# Patient Record
Sex: MALE | Race: BLACK OR AFRICAN AMERICAN | Employment: UNEMPLOYED | URBAN - METROPOLITAN AREA
[De-identification: names, ages, dates, MRNs, and addresses within clinical notes are randomized per-mention and may not be internally consistent; named-entity substitution may affect disease eponyms.]

---

## 2020-10-17 PROBLEM — S82.401A CLOSED FRACTURE OF RIGHT FIBULA AND TIBIA: Status: ACTIVE | Noted: 2020-10-17

## 2020-10-17 PROBLEM — S82.201A CLOSED FRACTURE OF RIGHT FIBULA AND TIBIA: Status: ACTIVE | Noted: 2020-10-17

## 2022-03-29 ENCOUNTER — HOSPITAL ENCOUNTER (INPATIENT)
Age: 42
LOS: 16 days | Discharge: HOME | DRG: 750 | End: 2022-04-14
Attending: EMERGENCY MEDICINE | Admitting: PSYCHIATRY & NEUROLOGY
Payer: COMMERCIAL

## 2022-03-29 DIAGNOSIS — R44.3 HALLUCINATION: Primary | ICD-10-CM

## 2022-03-29 PROBLEM — F20.0 SCHIZOPHRENIA, PARANOID (HCC): Status: ACTIVE | Noted: 2022-03-29

## 2022-03-29 LAB
ACETAMINOPHEN LEVEL: <5 MCG/ML (ref 10–30)
ALBUMIN SERPL-MCNC: 4.2 G/DL (ref 3.5–5.2)
ALP BLD-CCNC: 71 U/L (ref 40–129)
ALT SERPL-CCNC: 16 U/L (ref 0–40)
AMPHETAMINE SCREEN, URINE: NOT DETECTED
ANION GAP SERPL CALCULATED.3IONS-SCNC: 14 MMOL/L (ref 7–16)
AST SERPL-CCNC: 16 U/L (ref 0–39)
BARBITURATE SCREEN URINE: NOT DETECTED
BASOPHILS ABSOLUTE: 0.02 E9/L (ref 0–0.2)
BASOPHILS RELATIVE PERCENT: 0.3 % (ref 0–2)
BENZODIAZEPINE SCREEN, URINE: NOT DETECTED
BILIRUB SERPL-MCNC: 0.2 MG/DL (ref 0–1.2)
BUN BLDV-MCNC: 12 MG/DL (ref 6–20)
CALCIUM SERPL-MCNC: 8.5 MG/DL (ref 8.6–10.2)
CANNABINOID SCREEN URINE: NOT DETECTED
CHLORIDE BLD-SCNC: 101 MMOL/L (ref 98–107)
CO2: 24 MMOL/L (ref 22–29)
COCAINE METABOLITE SCREEN URINE: NOT DETECTED
CREAT SERPL-MCNC: 0.9 MG/DL (ref 0.7–1.2)
EKG ATRIAL RATE: 86 BPM
EKG P AXIS: 52 DEGREES
EKG P-R INTERVAL: 186 MS
EKG Q-T INTERVAL: 376 MS
EKG QRS DURATION: 92 MS
EKG QTC CALCULATION (BAZETT): 449 MS
EKG R AXIS: 60 DEGREES
EKG T AXIS: 29 DEGREES
EKG VENTRICULAR RATE: 86 BPM
EOSINOPHILS ABSOLUTE: 0.03 E9/L (ref 0.05–0.5)
EOSINOPHILS RELATIVE PERCENT: 0.5 % (ref 0–6)
ETHANOL: <10 MG/DL (ref 0–0.08)
FENTANYL SCREEN, URINE: NOT DETECTED
GFR AFRICAN AMERICAN: >60
GFR NON-AFRICAN AMERICAN: >60 ML/MIN/1.73
GLUCOSE BLD-MCNC: 128 MG/DL (ref 74–99)
HCT VFR BLD CALC: 33.8 % (ref 37–54)
HEMOGLOBIN: 11.2 G/DL (ref 12.5–16.5)
IMMATURE GRANULOCYTES #: 0.04 E9/L
IMMATURE GRANULOCYTES %: 0.6 % (ref 0–5)
INFLUENZA A: NOT DETECTED
INFLUENZA B: NOT DETECTED
LYMPHOCYTES ABSOLUTE: 1.4 E9/L (ref 1.5–4)
LYMPHOCYTES RELATIVE PERCENT: 21.5 % (ref 20–42)
Lab: NORMAL
MCH RBC QN AUTO: 30.4 PG (ref 26–35)
MCHC RBC AUTO-ENTMCNC: 33.1 % (ref 32–34.5)
MCV RBC AUTO: 91.8 FL (ref 80–99.9)
METHADONE SCREEN, URINE: NOT DETECTED
MONOCYTES ABSOLUTE: 0.81 E9/L (ref 0.1–0.95)
MONOCYTES RELATIVE PERCENT: 12.4 % (ref 2–12)
NEUTROPHILS ABSOLUTE: 4.22 E9/L (ref 1.8–7.3)
NEUTROPHILS RELATIVE PERCENT: 64.7 % (ref 43–80)
OPIATE SCREEN URINE: NOT DETECTED
OXYCODONE URINE: NOT DETECTED
PDW BLD-RTO: 13.2 FL (ref 11.5–15)
PHENCYCLIDINE SCREEN URINE: NOT DETECTED
PLATELET # BLD: 184 E9/L (ref 130–450)
PMV BLD AUTO: 10 FL (ref 7–12)
POTASSIUM SERPL-SCNC: 3.4 MMOL/L (ref 3.5–5)
RBC # BLD: 3.68 E12/L (ref 3.8–5.8)
SALICYLATE, SERUM: <0.3 MG/DL (ref 0–30)
SARS-COV-2 RNA, RT PCR: NOT DETECTED
SODIUM BLD-SCNC: 139 MMOL/L (ref 132–146)
TOTAL PROTEIN: 7.2 G/DL (ref 6.4–8.3)
TRICYCLIC ANTIDEPRESSANTS SCREEN SERUM: NEGATIVE NG/ML
VALPROIC ACID LEVEL: 3 MCG/ML (ref 50–100)
WBC # BLD: 6.5 E9/L (ref 4.5–11.5)

## 2022-03-29 PROCEDURE — 82077 ASSAY SPEC XCP UR&BREATH IA: CPT

## 2022-03-29 PROCEDURE — 85025 COMPLETE CBC W/AUTO DIFF WBC: CPT

## 2022-03-29 PROCEDURE — 80307 DRUG TEST PRSMV CHEM ANLYZR: CPT

## 2022-03-29 PROCEDURE — 80143 DRUG ASSAY ACETAMINOPHEN: CPT

## 2022-03-29 PROCEDURE — 1240000000 HC EMOTIONAL WELLNESS R&B

## 2022-03-29 PROCEDURE — 99285 EMERGENCY DEPT VISIT HI MDM: CPT

## 2022-03-29 PROCEDURE — 93010 ELECTROCARDIOGRAM REPORT: CPT | Performed by: INTERNAL MEDICINE

## 2022-03-29 PROCEDURE — 36415 COLL VENOUS BLD VENIPUNCTURE: CPT

## 2022-03-29 PROCEDURE — 80053 COMPREHEN METABOLIC PANEL: CPT

## 2022-03-29 PROCEDURE — 87636 SARSCOV2 & INF A&B AMP PRB: CPT

## 2022-03-29 PROCEDURE — 80164 ASSAY DIPROPYLACETIC ACD TOT: CPT

## 2022-03-29 PROCEDURE — 80179 DRUG ASSAY SALICYLATE: CPT

## 2022-03-29 PROCEDURE — 93005 ELECTROCARDIOGRAM TRACING: CPT | Performed by: EMERGENCY MEDICINE

## 2022-03-29 RX ORDER — LANOLIN ALCOHOL/MO/W.PET/CERES
3 CREAM (GRAM) TOPICAL NIGHTLY
Status: DISCONTINUED | OUTPATIENT
Start: 2022-03-29 | End: 2022-04-14 | Stop reason: HOSPADM

## 2022-03-29 RX ORDER — HYDROXYZINE HYDROCHLORIDE 10 MG/1
50 TABLET, FILM COATED ORAL 3 TIMES DAILY PRN
Status: DISCONTINUED | OUTPATIENT
Start: 2022-03-29 | End: 2022-03-29 | Stop reason: RX

## 2022-03-29 RX ORDER — HALOPERIDOL 5 MG/ML
5 INJECTION INTRAMUSCULAR EVERY 6 HOURS PRN
Status: DISCONTINUED | OUTPATIENT
Start: 2022-03-29 | End: 2022-04-14 | Stop reason: HOSPADM

## 2022-03-29 RX ORDER — NICOTINE 21 MG/24HR
1 PATCH, TRANSDERMAL 24 HOURS TRANSDERMAL DAILY
Status: DISCONTINUED | OUTPATIENT
Start: 2022-03-29 | End: 2022-03-29 | Stop reason: RX

## 2022-03-29 RX ORDER — HYDROXYZINE PAMOATE 50 MG/1
50 CAPSULE ORAL 3 TIMES DAILY PRN
Status: DISCONTINUED | OUTPATIENT
Start: 2022-03-29 | End: 2022-04-14 | Stop reason: HOSPADM

## 2022-03-29 RX ORDER — OLANZAPINE 10 MG/1
10 TABLET ORAL NIGHTLY
Status: ON HOLD | COMMUNITY
End: 2022-04-14 | Stop reason: HOSPADM

## 2022-03-29 RX ORDER — ACETAMINOPHEN 325 MG/1
650 TABLET ORAL EVERY 6 HOURS PRN
Status: DISCONTINUED | OUTPATIENT
Start: 2022-03-29 | End: 2022-04-14 | Stop reason: HOSPADM

## 2022-03-29 RX ORDER — OLANZAPINE 5 MG/1
5 TABLET ORAL 2 TIMES DAILY
Status: DISCONTINUED | OUTPATIENT
Start: 2022-03-29 | End: 2022-03-30

## 2022-03-29 RX ORDER — DIVALPROEX SODIUM 250 MG/1
250 TABLET, DELAYED RELEASE ORAL 2 TIMES DAILY
Status: ON HOLD | COMMUNITY
End: 2022-04-14 | Stop reason: HOSPADM

## 2022-03-29 RX ORDER — DIVALPROEX SODIUM 250 MG/1
250 TABLET, DELAYED RELEASE ORAL 2 TIMES DAILY
Status: DISCONTINUED | OUTPATIENT
Start: 2022-03-29 | End: 2022-03-30

## 2022-03-29 RX ORDER — HALOPERIDOL 5 MG
5 TABLET ORAL EVERY 6 HOURS PRN
Status: DISCONTINUED | OUTPATIENT
Start: 2022-03-29 | End: 2022-04-14 | Stop reason: HOSPADM

## 2022-03-29 RX ORDER — MAGNESIUM HYDROXIDE/ALUMINUM HYDROXICE/SIMETHICONE 120; 1200; 1200 MG/30ML; MG/30ML; MG/30ML
30 SUSPENSION ORAL PRN
Status: DISCONTINUED | OUTPATIENT
Start: 2022-03-29 | End: 2022-04-14 | Stop reason: HOSPADM

## 2022-03-29 ASSESSMENT — PAIN DESCRIPTION - ORIENTATION: ORIENTATION: RIGHT

## 2022-03-29 ASSESSMENT — PAIN DESCRIPTION - LOCATION: LOCATION: LEG

## 2022-03-29 ASSESSMENT — SLEEP AND FATIGUE QUESTIONNAIRES
DIFFICULTY STAYING ASLEEP: YES
DO YOU HAVE DIFFICULTY SLEEPING: YES
RESTFUL SLEEP: NO
DIFFICULTY FALLING ASLEEP: YES
SLEEP PATTERN: DIFFICULTY FALLING ASLEEP;DISTURBED/INTERRUPTED SLEEP
AVERAGE NUMBER OF SLEEP HOURS: 3
DO YOU USE A SLEEP AID: NO
DIFFICULTY ARISING: NO

## 2022-03-29 ASSESSMENT — PAIN DESCRIPTION - DESCRIPTORS: DESCRIPTORS: ACHING;CONSTANT;DISCOMFORT

## 2022-03-29 ASSESSMENT — PAIN DESCRIPTION - FREQUENCY: FREQUENCY: CONTINUOUS

## 2022-03-29 ASSESSMENT — PAIN DESCRIPTION - PROGRESSION: CLINICAL_PROGRESSION: NOT CHANGED

## 2022-03-29 ASSESSMENT — PAIN SCALES - GENERAL
PAINLEVEL_OUTOF10: 7
PAINLEVEL_OUTOF10: 0

## 2022-03-29 ASSESSMENT — PAIN DESCRIPTION - DIRECTION: RADIATING_TOWARDS: NON

## 2022-03-29 ASSESSMENT — LIFESTYLE VARIABLES: HISTORY_ALCOHOL_USE: YES

## 2022-03-29 ASSESSMENT — PAIN DESCRIPTION - PAIN TYPE: TYPE: CHRONIC PAIN

## 2022-03-29 ASSESSMENT — PAIN DESCRIPTION - ONSET: ONSET: ON-GOING

## 2022-03-29 ASSESSMENT — PAIN - FUNCTIONAL ASSESSMENT: PAIN_FUNCTIONAL_ASSESSMENT: ACTIVITIES ARE NOT PREVENTED

## 2022-03-29 NOTE — ED NOTES
Nurse to Nurse report given to Lev Parrish on [address].      Tristen Pope RN  03/29/22 2666

## 2022-03-29 NOTE — ED NOTES
Emergency Department [facility] AN AFFILIATE OF [facility] Biopsychosocial Assessment Note    Chief Complaint:   hearing voices that say that they want to kill him. Kendall Connell denies wanting to hurt himself and states \"I just want to live\"    Patient wanting back on his medications that he has not been on since he was released from assisted on 3/25/2022. Hearing voices that tell he they want to kill him. Denies wanting to hurt himself states \"I want to live\"    Clinical Summary/History:   Pt presented from the [facility]. He explained that he called the police himself because he was hearing auditory hallucination and said that the voices are trying to kill him. Pt is from [place] and treated with Catrachita Borjas many years ago for paranoid schizophrenia. He said that he was on a shot that would help with the voices, but since being in assisted he was off the shot. Pt was released from [facility] on 3/25/22 and is a sex offender. He has a hx of suicide attempts, last attempting by overdosing on pills many years ago. He has no MH services at this time. Pt was is scheduled to follow up with [facility] on 4/13/22 to start services. He was taking zyprexa and depakote in assisted for the past 1 year. MSE:  Pt denies HI and no visual hallucinations. He is calm, cooperative, alert and oriented x's 3, shared fair eye contact - obviously internally stimulated, looking around the room when talking and appears to be distracted with possible voices, unkempt, thoughts are unstable, fair insight and judgement. Risk Factors:  Mental Health Diagnosis - paranoid schizophrenic  Prior suicide attempts  Limited family/friend support  Previous trouble with the law - sex offendor    Protective Factors: Outpatient provider - appt to [facility] on 4/13/22  Help-seeking behavior  Safe and stable housing  Has access to essential needs  Hx of Medication compliant  Good communication Skills  No access to weapons.     [x] Discussed protective and risk factors with RN and ED Physician     Gender  [x] Male [] Female [] Transgender  [] Other    Sexual Orientation    [x] Heterosexual [] Homosexual [] Bisexual [] Other    Suicidal Behavioral: CSSR-S Complete. [x] Reports:    [] Past [] Present   [] Denies    Homicidal/ Violent Behavior  [] Reports:   [] Past [] Present   [x] Denies     Violence Risk Screenin. Have you ever engaged in a physical fight? []  Yes [x]  No  2. Have you ever had an order of protection taken out against you? []  Yes [x]  No  3. Have you ever been arrested due to violence? []  Yes [x]  No  4. Have you ever been cruel to animals? []  Yes [x]  No    If any of the above questions are affirmative, please complete these questions:  1. Have you ever thought about hurting someone? []  No  []  Yes (Ask the questions listed below)   When?  Did you follow through with the thoughts? []  No  []  Yes - What happened? 2.  Have you ever threatened anyone? []  No  []  Yes (Ask the questions listed below)   When and what happened?  Have you ever threatened someone with a gun, knife or other weapon? []  No  []  Yes - When and what happened? 3. Have you ever physically hurt someone? []  No  []  Yes (Ask the questions listed below)   When and what happened?  How many times have you physically hurt someone in the past?    Hallucinations/Delusions   [x] Reports:   [] Denies     Substance Use/Alcohol Use/Addiction: SBIRT Screen Complete. [] Reports:   [x] Denies     Trauma History  [] Reports:  [x] Denies     Collateral Information:   @5492, Pt's sister Latoya Patton, called to report that he is schizophrenic. She said that pt was transferred from [facility] to [facility], but the family would like for him to return back to a half-way house in [facility]. Today, She said that he called her this morining and said that the people at the house are trying to kill him. She said that she realized that he is off meds.     She spoke to pt on the phone.      Level of Care/Disposition Plan  [] Home:   [] Outpatient Provider:   [] Crisis Unit:   [x] Inpatient Psychiatric Unit:  [] Other:        YANI Garcia  03/29/22 [address] Mariano Fields  03/29/22 4703

## 2022-03-29 NOTE — ED NOTES
Assigned [id number] to [address] [place] [address] admitting     Srinivasan Scott, hospitals  03/29/22 9477

## 2022-03-29 NOTE — ED TRIAGE NOTES
Patient wanting back on his medications that he has not been on since he was released from skilled nursing on 3/25/2022. Hearing voices that tell he they want to kill him.   Denies wanting to hurt himself states \"I want to live\"

## 2022-03-29 NOTE — ED NOTES
Spoke with provider patient need 15 minute checks but no one to one at this time.      Wen Fernandez, OMEGA  03/29/22 0787

## 2022-03-29 NOTE — PROGRESS NOTES
Excused from evening relaxation group. Just arrived to unit and taken to office for Nursing admission completion.

## 2022-03-29 NOTE — ED PROVIDER NOTES
HPI:  3/29/22, Time: 3:40 PM EDT         Vinnie Badillo is a 42 y.o. male presenting to the ED for psychiatric evaluation. Patient states his hearing voices that are telling to kill himself. He does have remote history of suicide attempt. No homicidal ideation. No recent suicide attempt. He does not have a psychiatrist.  Denies any other symptoms or complaints. Review of Systems:   A complete review of systems was performed and pertinent positives and negatives are stated within HPI, all other systems reviewed and are negative.          --------------------------------------------- PAST HISTORY ---------------------------------------------  Past Medical History:  has a past medical history of Smoking. Past Surgical History:  has a past surgical history that includes Tibia fracture surgery (Right, 10/17/2020). Social History:  reports that he has quit smoking. He has never used smokeless tobacco. He reports previous alcohol use. He reports current drug use. Drug: Marijuana Elham Linus). Family History: family history is not on file. The patients home medications have been reviewed. Allergies: Patient has no known allergies.     -------------------------------------------------- RESULTS -------------------------------------------------  All laboratory and radiology results have been personally reviewed by myself   LABS:  Results for orders placed or performed during the hospital encounter of 03/29/22   COVID-19 & Influenza Combo    Specimen: Nasopharyngeal Swab   Result Value Ref Range    SARS-CoV-2 RNA, RT PCR NOT DETECTED NOT DETECTED    INFLUENZA A NOT DETECTED NOT DETECTED    INFLUENZA B NOT DETECTED NOT DETECTED   CBC with Auto Differential   Result Value Ref Range    WBC 6.5 4.5 - 11.5 E9/L    RBC 3.68 (L) 3.80 - 5.80 E12/L    Hemoglobin 11.2 (L) 12.5 - 16.5 g/dL    Hematocrit 33.8 (L) 37.0 - 54.0 %    MCV 91.8 80.0 - 99.9 fL    MCH 30.4 26.0 - 35.0 pg    MCHC 33.1 32.0 - 34.5 %    RDW 13.2 11.5 - 15.0 fL    Platelets 576 818 - 581 E9/L    MPV 10.0 7.0 - 12.0 fL    Neutrophils % 64.7 43.0 - 80.0 %    Immature Granulocytes % 0.6 0.0 - 5.0 %    Lymphocytes % 21.5 20.0 - 42.0 %    Monocytes % 12.4 (H) 2.0 - 12.0 %    Eosinophils % 0.5 0.0 - 6.0 %    Basophils % 0.3 0.0 - 2.0 %    Neutrophils Absolute 4.22 1.80 - 7.30 E9/L    Immature Granulocytes # 0.04 E9/L    Lymphocytes Absolute 1.40 (L) 1.50 - 4.00 E9/L    Monocytes Absolute 0.81 0.10 - 0.95 E9/L    Eosinophils Absolute 0.03 (L) 0.05 - 0.50 E9/L    Basophils Absolute 0.02 0.00 - 0.20 E9/L   Comprehensive Metabolic Panel   Result Value Ref Range    Sodium 139 132 - 146 mmol/L    Potassium 3.4 (L) 3.5 - 5.0 mmol/L    Chloride 101 98 - 107 mmol/L    CO2 24 22 - 29 mmol/L    Anion Gap 14 7 - 16 mmol/L    Glucose 128 (H) 74 - 99 mg/dL    BUN 12 6 - 20 mg/dL    CREATININE 0.9 0.7 - 1.2 mg/dL    GFR Non-African American >60 >=60 mL/min/1.73    GFR African American >60     Calcium 8.5 (L) 8.6 - 10.2 mg/dL    Total Protein 7.2 6.4 - 8.3 g/dL    Albumin 4.2 3.5 - 5.2 g/dL    Total Bilirubin 0.2 0.0 - 1.2 mg/dL    Alkaline Phosphatase 71 40 - 129 U/L    ALT 16 0 - 40 U/L    AST 16 0 - 39 U/L   Serum Drug Screen   Result Value Ref Range    Ethanol Lvl <10 mg/dL    Acetaminophen Level <5.0 (L) 10.0 - 38.5 mcg/mL    Salicylate, Serum <1.8 0.0 - 30.0 mg/dL    TCA Scrn NEGATIVE Cutoff:300 ng/mL   Urine Drug Screen   Result Value Ref Range    Amphetamine Screen, Urine NOT DETECTED Negative <1000 ng/mL    Barbiturate Screen, Ur NOT DETECTED Negative < 200 ng/mL    Benzodiazepine Screen, Urine NOT DETECTED Negative < 200 ng/mL    Cannabinoid Scrn, Ur NOT DETECTED Negative < 50ng/mL    Cocaine Metabolite Screen, Urine NOT DETECTED Negative < 300 ng/mL    Opiate Scrn, Ur NOT DETECTED Negative < 300ng/mL    PCP Screen, Urine NOT DETECTED Negative < 25 ng/mL    Methadone Screen, Urine NOT DETECTED Negative <300 ng/mL    Oxycodone Urine NOT DETECTED Negative <100 ng/mL FENTANYL SCREEN, URINE NOT DETECTED Negative <1 ng/mL    Drug Screen Comment: see below    Valproic Acid Level, Total   Result Value Ref Range    Valproic Acid Lvl 3 (L) 50 - 100 mcg/mL   EKG 12 Lead   Result Value Ref Range    Ventricular Rate 86 BPM    Atrial Rate 86 BPM    P-R Interval 186 ms    QRS Duration 92 ms    Q-T Interval 376 ms    QTc Calculation (Bazett) 449 ms    P Axis 52 degrees    R Axis 60 degrees    T Axis 29 degrees       RADIOLOGY:  Interpreted by Radiologist.  No orders to display       ------------------------- NURSING NOTES AND VITALS REVIEWED ---------------------------   The nursing notes within the ED encounter and vital signs as below have been reviewed. BP (!) 154/87   Pulse 84   Temp 98.6 °F (37 °C) (Oral)   Resp 16   Ht 5' 8\" (1.727 m)   Wt 210 lb (95.3 kg)   SpO2 99%   BMI 31.93 kg/m²   Oxygen Saturation Interpretation: Normal      ---------------------------------------------------PHYSICAL EXAM--------------------------------------      Constitutional/General: Alert and oriented x3, well appearing, non toxic in NAD  Head: Normocephalic and atraumatic  Eyes: PERRL, EOMI  Mouth: Oropharynx clear, handling secretions, no trismus  Neck: Supple, full ROM,   Pulmonary: Lungs clear to auscultation bilaterally, no wheezes, rales, or rhonchi. Not in respiratory distress  Cardiovascular:  Regular rate and rhythm, no murmurs, gallops, or rubs. 2+ distal pulses  Abdomen: Soft, non tender, non distended,   Extremities: Moves all extremities x 4. Warm and well perfused  Skin: warm and dry without rash  Neurologic: GCS 15,  Psych: Normal Affect      ------------------------------ ED COURSE/MEDICAL DECISION MAKING----------------------  Medications - No data to display    EKG: Sinus rhythm, 86, no STEMI, no ischemic change    ED COURSE:       Medical Decision Making:    Medically clear     Counseling:    The emergency provider has spoken with the patient and discussed todays results, in addition to providing specific details for the plan of care and counseling regarding the diagnosis and prognosis. Questions are answered at this time and they are agreeable with the plan.      --------------------------------- IMPRESSION AND DISPOSITION ---------------------------------    IMPRESSION  1. Hallucination        DISPOSITION  Disposition: Admit to mental health unit - medically cleared for admission  Patient condition is stable      NOTE: This report was transcribed using voice recognition software.  Every effort was made to ensure accuracy; however, inadvertent computerized transcription errors may be present       Varsha Crump MD  03/29/22 2550

## 2022-03-30 PROCEDURE — 1240000000 HC EMOTIONAL WELLNESS R&B

## 2022-03-30 PROCEDURE — 99222 1ST HOSP IP/OBS MODERATE 55: CPT | Performed by: NURSE PRACTITIONER

## 2022-03-30 PROCEDURE — 6370000000 HC RX 637 (ALT 250 FOR IP): Performed by: PSYCHIATRY & NEUROLOGY

## 2022-03-30 PROCEDURE — 6370000000 HC RX 637 (ALT 250 FOR IP): Performed by: NURSE PRACTITIONER

## 2022-03-30 RX ORDER — DIVALPROEX SODIUM 500 MG/1
500 TABLET, DELAYED RELEASE ORAL EVERY 12 HOURS SCHEDULED
Status: DISCONTINUED | OUTPATIENT
Start: 2022-03-30 | End: 2022-04-14 | Stop reason: HOSPADM

## 2022-03-30 RX ORDER — RISPERIDONE 0.5 MG/1
1 TABLET, ORALLY DISINTEGRATING ORAL 2 TIMES DAILY
Status: DISCONTINUED | OUTPATIENT
Start: 2022-03-30 | End: 2022-03-31

## 2022-03-30 RX ADMIN — DIVALPROEX SODIUM 250 MG: 250 TABLET, DELAYED RELEASE ORAL at 09:28

## 2022-03-30 RX ADMIN — RISPERIDONE 1 MG: 0.5 TABLET, ORALLY DISINTEGRATING ORAL at 11:58

## 2022-03-30 RX ADMIN — OLANZAPINE 5 MG: 5 TABLET, FILM COATED ORAL at 09:28

## 2022-03-30 RX ADMIN — Medication 3 MG: at 20:35

## 2022-03-30 RX ADMIN — DIVALPROEX SODIUM 500 MG: 500 TABLET, DELAYED RELEASE ORAL at 20:35

## 2022-03-30 RX ADMIN — DIVALPROEX SODIUM 250 MG: 500 TABLET, DELAYED RELEASE ORAL at 11:57

## 2022-03-30 RX ADMIN — RISPERIDONE 1 MG: 0.5 TABLET, ORALLY DISINTEGRATING ORAL at 20:36

## 2022-03-30 ASSESSMENT — SLEEP AND FATIGUE QUESTIONNAIRES
AVERAGE NUMBER OF SLEEP HOURS: 3
DO YOU HAVE DIFFICULTY SLEEPING: YES
SLEEP PATTERN: DIFFICULTY FALLING ASLEEP;DISTURBED/INTERRUPTED SLEEP
DO YOU USE A SLEEP AID: NO
DIFFICULTY STAYING ASLEEP: YES
DIFFICULTY FALLING ASLEEP: YES
RESTFUL SLEEP: NO
DIFFICULTY ARISING: NO

## 2022-03-30 ASSESSMENT — LIFESTYLE VARIABLES: HISTORY_ALCOHOL_USE: NO

## 2022-03-30 NOTE — CARE COORDINATION
JULI received a call from [facility] and was informed that pt is not currently on probation through them, SW was informed to call the [phone] to determine if pt is on parole currently. JULI called [phone] press 3 then press 5 to speak with Charleen Rendon. Juli spoke with Charleen Rendon that stated the pt is living at the [address] [place] [address] and he would prefer for the pt to return back to the [address] [place] [address]. If the pt choose not to return back to [address] [place] [address] he will need to return back to [facility]. Charleen Rendon stated that he would prefer for the pt to go back to the [address] [place] [address] because it is a safe and stable living environment. JULI will discuss discharge placement options when pt is stable and no longer has paranoia about people trying to harm him at the [address] [place] [address].

## 2022-03-30 NOTE — BH NOTE
Pt remains stable in bed. Pt is isolative to room. Pt denies suicidal or homicidal ideations. Pt is variable in responses to hearing voices. When he does hear them, he advises they are from the bathroom. Pt denies visual disturbances. Will monitor and follow.

## 2022-03-30 NOTE — PROGRESS NOTES
[facility]  Admission Note     Admission Type:   Admission Type: Involuntary    Reason for admission:  Reason for Admission: \"I KEPT HEARING VOICES SAYING \"KILL HIM, SHOOT HIM\"    PATIENT STRENGTHS:  Strengths: Communication,No significant Physical Illness    Patient Strengths and Limitations:  Limitations: Difficult relationships / poor social skills,Demonstrates discomfort with /lack of social skills,Inappropriate/potentially harmful leisure interests,Tendency to isolate self,Lacks leisure interests (ON PAROLE)    Addictive Behavior:   Addictive Behavior  In the past 3 months, have you felt or has someone told you that you have a problem with:  : Sex/Pornography  Do you have a history of Chemical Use?: No  Do you have a history of Alcohol Use?: Yes  Do you have a history of Street Drug Abuse?: Yes  Histroy of Prescripton Drug Abuse?: Yes    Medical Problems:   Past Medical History:   Diagnosis Date    Smoking        Status EXAM:  Status and Exam  Normal: No  Facial Expression: Sad,Flat  Affect: Blunt  Level of Consciousness: Alert  Mood:Normal: No  Mood: Depressed,Anxious,Suspicious,Sad  Motor Activity:Normal: No  Motor Activity: Decreased,Tremors  Interview Behavior: Evasive  Preception: [place] to Person,[place] to Place,[place] to Situation,[place] to Time  Attention:Normal: No  Attention: Unable to Concentrate  Thought Processes: Blocking  Thought Content:Normal: No  Thought Content: Delusions,Paranoia,Poverty of Content,Other(See Comment) (DELAYED)  Hallucinations:  Auditory (Comment)  Delusions: Yes  Delusions: Persecution,Obsessions,Other(See Comment) (PARANOID)  Memory:Normal: No  Memory: Poor Recent,Poor Remote  Insight and Judgment: No  Insight and Judgment: Poor Judgment,Poor Insight,Unrealistic  Present Suicidal Ideation: No  Present Homicidal Ideation: No    Tobacco Screening:  Practical Counseling, on admission, harsha X, if applicable and completed (first 3 are required if patient doesn't refuse):            ( )  Recognizing danger situations (included triggers and roadblocks)                    ( )  Coping skills (new ways to manage stress, exercise, relaxation techniques, changing routine, distraction)                                                           ( )  Basic information about quitting (benefits of quitting, techniques in how to quit, available resources  ( ) Referral for counseling faxed to [facility]                                           ( ) Patient refused counseling  ( x) Patient has not smoked in the last 30 days    Metabolic Screening:    No results found for: LABA1C    No results found for: CHOL  No results found for: TRIG  No results found for: HDL  No components found for: LDLCAL  No results found for: LABVLDL      Body mass index is 31.93 kg/m². BP Readings from Last 2 Encounters:   03/29/22 (!) 144/83   03/03/21 136/84           Pt admitted with followings belongings:  Dental Appliances: None  Vision - Corrective Lenses: None  Hearing Aid: None  Jewelry: None  Body Piercings Removed: N/A  Clothing:  (Bagged belongings to be sent with transport.)  Were All Patient Medications Collected?: Not Applicable     Patient's home medications were reviewed and he has not had any medications since released from [facility] 2 days ago  Patient oriented to surroundings and program expectations and copy of patient rights given. Received admission packet. Consent reviewed, signed . Refused to complete self safety assessment, diet orders and OQ. Had difficulty following directions and dated papers for the 30th after reoriented to todays date. Patient reports he\" has been in many [facility] hospitals and knows we can change things after I signs them. \" Patient educated on falls, precautions, suicide precautions and behavioral precautions and frequency of checks.                   Arash Lin RN

## 2022-03-30 NOTE — PLAN OF CARE
Pt is without distress at this time. Pt is isolative, quiet, flat affect, thought blocking. Pt denies suicidal / homicidal ideations at this time. Pt reports voices coming from the bathroom in his room presently. Denies commands Denies visual disturbances. Pt appears paranoid. Pt does not report a goal during morning assessment. Will follow and monitor.

## 2022-03-30 NOTE — PROGRESS NOTES
Patient pleasantly declined evening medications. Patient stated \"This is not the medication I take at home. \" This nurse explained to patient pills may be a different color than he is used to taking at home because the hospital uses a different medication manufacture than his at home pharmacy does. Will continue to monitor.

## 2022-03-30 NOTE — BH NOTE
[facility]  Initial Interdisciplinary Treatment Plan NOTE    Review Date & Time: 3-30-22  1000 am    Patient was not in treatment team    Admission Type:   Admission Type: Involuntary    Reason for admission:  Reason for Admission: \"I KEPT HEARING VOICES SAYING \"KILL HIM, SHOOT HIM\"      Estimated Length of Stay Update:  3-5 days  Estimated Discharge Date Update: 3-5 days    EDUCATION:   Learner Progress Toward Treatment Goals: Reviewed results and recommendations of this team    Method: Small group    Outcome: Verbalized understanding    PATIENT GOALS: pt did not report a goal during first morning assessment. PLAN/TREATMENT RECOMMENDATIONS UPDATE: Begin medication regimen and assess pt responses. GOALS UPDATE:   Time frame for Short-Term Goals: Daily re assessment.      Natacha Gillespie RN

## 2022-03-30 NOTE — H&P
Department of Psychiatry  History and Physical - Adult     CHIEF COMPLAINT: Internally stimulated auditory hallucinations paranoid and guarded    Patient was seen after discussing with the treatment team and reviewing the chart    CIRCUMSTANCES OF ADMISSION: presented to the ED after patient called police from the [facility] reporting auditory hallucinations the voices are trying to kill him. HISTORY OF PRESENT ILLNESS:      The patient is a 42 y.o. male with significant past history of schizophrenia and tibia fracture presented to the ED after patient called police from the [facility] reporting auditory hallucinations the voices are trying to kill him. According to the chart patient from [place] is treated with Shea Waite for paranoid schizophrenia. Patient reported ED that he had his last shot to help with the voices in the past but since he was in [facility] he was off the shot. Per the chart [facility] on 3/[facility]/2022 and is a registered sex offender. In the screen is negative his blood alcohol level is negative he is medically cleared admitted to 7 SE. out psychiatric unit for further psychiatric assessment stabilization and treatment. Upon evaluation today patient is clearly paranoid guarded suspicious and psychotic. He is thought blocking and his responses are slow and delayed. He is not able to offer much conversation. He reports auditory hallucinations coming from the bathroom in his room. He reported hearing voices saying \"kill him, shoot him. \"  Patient reportedly was paranoid last night stay the medication color was different than what he takes at home. Patient is currently having auditory hallucinations is clearly internally stimulated he is guarded paranoid psychotic.   History is limited at this time due to patient's current psychotic symptoms and all history is taken from the chart      Past psychiatric history: Patient is a history of treating in [place] which he may he is in the past.  Was on a long-acting injection in the past was on Zyprexa and Depakote in assisted for the last year. Not currently on any psychotropic medication the scheduled follow-up with [facility] on 4/13/2022. He admits to past suicide attempts by overdosing on pills several years ago    Legal history: Patient is currently on parole with the [facility] a group home after being released from assisted 4 days earlier after serving a year and is a registered sex offender    Substance use history: Urine drug screen blood alcohol level were negative    Personal family and social history: Patient is currently living at the [facility] after being released from assisted 4 days earlier      Past Medical History:        Diagnosis Date    Smoking        Medications Prior to Admission:   Medications Prior to Admission: divalproex (DEPAKOTE) 250 MG DR tablet, Take 250 mg by mouth 2 times daily Patient unsure of dosage has not had any since his release from [facility]  OLANZapine (ZYPREXA) 10 MG tablet, Take 10 mg by mouth nightly Patient unsure of dosage he has not had any since his release from assisted on 3/25/2022  naproxen (NAPROSYN) 500 MG tablet, Take 1 tablet by mouth 2 times daily (Patient not taking: Reported on 3/3/2021)    Past Surgical History:        Procedure Laterality Date    TIBIA FRACTURE SURGERY Right 10/17/2020       Allergies:   Patient has no known allergies. Family History  No family history on file. EXAMINATION:    REVIEW OF SYSTEMS:    ROS:  [x] All negative/unchanged except if checked.  Explain positive(checked items) below:  [] Constitutional  [] Eyes  [] Ear/Nose/Mouth/Throat  [] Respiratory  [] CV  [] GI  []   [] Musculoskeletal  [] Skin/Breast  [] Neurological  [] Endocrine  [] Heme/Lymph  [] Allergic/Immunologic    Explanation:     Vitals:  BP (!) 143/86   Pulse 94   Temp 97.6 °F (36.4 °C) (Temporal)   Resp 16   Ht 5' 8\" (1.727 m)   Wt 210 lb (95.3 kg)   SpO2 100%   BMI 31.93 kg/m² Physical Examination:   Head: x  Atraumatic: x normocephalic  Skin and Mucosa        Moist x  Dry   Pale  x Normal   Neck:  Thyroid  Palpable   x  Not palpable   venus distention   adenopathy   Chest: x Clear   Rhonchi     Wheezing   CV:  xS1   xS2    xNo murmer   Abdomen:  x  Soft    Tender    Viceromegaly   Extremities:  x No Edema     Edema     Cranial Nerves Examination:   CN II:   xPupils are reactive to light  Pupils are non reactive to light  CN III, IV, VI:  xNo eye deviation    No diplopia or ptosis   CN V:    xFacial Sensation is intact     Facial Sensation is not intact   CN IIIV:   x Hearing is normal to rubbing fingers   CN IX, X:     xNormal gag reflex and phonation   CN XI:   xShoulder shrug and neck rotation is normal  CNXII:    xTongue is midline no deviation or atrophy    Mental Status Examination:    Level of consciousness:  within normal limits   Appearance:  well-appearing  Behavior/Motor:  no abnormalities noted  Attitude toward examiner:  cooperative  Speech:  spontaneous, normal rate and normal volume   Mood: Unable to assess  Affect: Flat and blunted  Thought processes:  delayed thoughts  Thought content: Auditory hallucinations appears paranoid internally stimulated denies SI/HI intent or plan  Cognition:  oriented to person, place, and time   Concentration intact  Memory intact  Insight poor   Judgement poor   Fund of Knowledge limited      DIAGNOSIS:  Paranoid schizophrenia          LABS: REVIEWED TODAY:  Recent Labs     03/29/22  1200   WBC 6.5   HGB 11.2*        Recent Labs     03/29/22  1200      K 3.4*      CO2 24   BUN 12   CREATININE 0.9   GLUCOSE 128*     Recent Labs     03/29/22  1200   BILITOT 0.2   ALKPHOS 71   AST 16   ALT 16     Lab Results   Component Value Date    LABAMPH NOT DETECTED 03/29/2022    BARBSCNU NOT DETECTED 03/29/2022    LABBENZ NOT DETECTED 03/29/2022    LABMETH NOT DETECTED 03/29/2022    OPIATESCREENURINE NOT DETECTED 03/29/2022 PHENCYCLIDINESCREENURINE NOT DETECTED 03/29/2022    ETOH <10 03/29/2022     No results found for: TSH, FREET4  No results found for: LITHIUM  Lab Results   Component Value Date    VALPROATE 3 (L) 03/29/2022     Lab Results   Component Value Date    VALPROATE 3 03/29/2022         Radiology No results found. TREATMENT PLAN:    Risk Management: Based on the diagnosis and assessment biopsychosocial treatment model was presented to the patient and was given the opportunity to ask any question. The patient was agreeable to the plan and all the patient's questions were answered to the patient's satisfaction. I discussed with the patient the risk, benefit, alternative and common side effects for the proposed medication treatment. The patient is consenting to this treatment. Collateral Information:  Will obtain collateral information from the family or friends. Will obtain medical records as appropriate from out patient providers  Will consult the hospitalist for a physical exam to rule out any co-morbid physical condition. Patient's diagnosis, treatment plan, medication management was formulated at the end of evaluation and after reviewing relevant documentation. Patient was seen directly by myself and Dr. Sabina Aguilar    Can discontinue constant observer. Patient is deemed to be moderate risk for suicide based on productive risk factors as well as risk mitigation      Risk Factors:  Mental Health Diagnosis - paranoid schizophrenic  Prior suicide attempts  Limited family/friend support  Previous trouble with the law - sex offendor     Protective Factors: Outpatient provider - appt to [facility] on 4/13/22  Help-seeking behavior  Safe and stable housing  Has access to essential needs  Hx of Medication compliant  Good communication Skills  No access to weapons. New Medications started during this admission :        Prn Haldol 5mg and Vistaril 50mg q6hr for extreme agitation.   Trazodone as ordered for insomnia  Vistaril as ordered for anxiety      Psychotherapy:   Encourage participation in milieu and group therapy  Individual therapy as needed              Behavioral Services  Medicare Certification Upon Admission    I certify that this patient's inpatient psychiatric hospital admission is medically necessary for:    [x] (1) Treatment which could reasonably be expected to improve this patient's condition,       [] (2) Or for diagnostic study;     AND     [x](2) The inpatient psychiatric services are provided while the individual is under the care of a physician and are included in the individualized plan of care.     Estimated length of stay/service 3-7 days based on stability    Plan for post-hospital care outpatient psychiatric and counseling services    Electronically signed by ASHLYN Tipton CNP on 0/69/9871 at 8:35 AM        Electronically signed by ASHLYN Tipton CNP on 7/98/5567 at 8:34 AM

## 2022-03-30 NOTE — CARE COORDINATION
Biopsychosocial Assessment Note    Social work met with patient to complete the biopsychosocial assessment and CSSR-S. Mental Status Exam: Pt is alert and oriented x3. Pt appears confused and internally stimulated. Pt is calm, evasive, and flat. Pt's mood is anxious, affect is unstable. Pt's eye contact is poor, speech is delayed, rate is slow, volume is low. Pt's thought process is blocking. Pt is paranoid and delusional. Pt offers limited information on reasoning why he is here. Pt's insight and judgement is poor. Pt stated that he is having AH. Pt denied SI, HI, VH. Chief Complaint: Per ED SW note \"hearing voices that say that they want to kill him. Odin Padilla denies wanting to hurt himself and states \"I just want to live\"\"    Patient Report: pt reported that he is currently here because he is hearing voices since he stopped taking his medications after he was discharged from [facility]. Pt stated that he was in [facility] for 1 year due to possession of a weapon and he is currently on a [facility] and is living at the [facility]. Pt stated that he has a PO Attila from [facility]. Pt reported that he was suppose to have an appointment with [facility] coming up so that he can get back on his medications including Depakote and Zyprexa. Pt reported that his main support system is his mom and he is currently living at the [facility] but he is afraid to return because he was hearing the voices and they were threatening to harm him when he was there. Pt stated that he is currently unemployed and he is trying to reapply for SSI and just started receiving food stamps. Pt reported that he has a metal sharda in his right leg and his left leg is currently broken. Pt is currently single and stated that he has 1 son So who is 13 and lives with his mother. Pt reported that he was raised by his biological mother and he has a strained relationship with his father. Pt denied any family history of mental health.  Pt stated that he has a total of 6 brothers and 2 sisters. Pt denied any history of abuse. Pt reported that he has a past history of abusing prescription medications, pt reported that these medications are prescribed to him, his mom and some to his dad. Pt is unable to recall the last time he took a prescription medications without a medical need. Pt also reported to the past use of marijuana, last smoked over a year ago, and he use to smoke it every day. Pt is unable to recall the first time he used marijuana. Pt stated that his highest level of education was a GED with some college. Pt stated that he applied to get into the [facility] but was denied due to his legal record. Pt stated that his main stressors are that the voices are threatening to harm him. Pt denied any past suicide attempts or thoughts to harm himself. Risk Factors: pt has a mental health history, pt ran out of medications and is having AH, pt has limited support, pt has a legal history, pt has a history of substance use. Protective Factors: Pt has [facility] as an outpatient provider, pt has help seeking behavior, pt is living at the [address] [place] [address], pt denied any past suicide attempts or thoughts to end his life.      Gender  [x] Male [] Female [] Transgender  [] Other    Sexual Orientation    [x] Heterosexual [] Homosexual [] Bisexual [] Other    Suicidal Ideation  [] Past [] Present [x] Denies     Homicidal Ideation  [] Past [] Present [x] Denies     Hallucinations/Delusions (Specify type)  [x] Reports [] Denies     Substance Use/Alcohol Use/Addiction  [x] Reports [] Denies     Tobacco Use (within the last 6 months)  [] Reports [x] Denies     Trauma History  [] Reports [x] Denies     Collateral Contact (MARGARET signed)  Name:  Isabelle You   Relationship: PO  Number: Pt is unsure of number stated that he is thought [place]    Collateral Information:        Access to Weapons per Collateral Contact: [] Reports [] Denies       Follow up provider preference: [facility]      Plan for discharge  Location (where do they plan on discharging to?): pt does not want to return back to the [address] [place] [address] (who will pick them up at discharge?) TBD    Medications (will they have money for copays at discharge?): Pt has [facility] and they will cover copay on medications at discharge.

## 2022-03-30 NOTE — CARE COORDINATION
CASEY called the [facility] office to speak with pt's  Phone: [phone] press [phone] for operation. CASEY left a voicemail stating that CASEY is attempting to get into contact with JUDE Estevez regarding a pt.

## 2022-03-31 PROCEDURE — 6370000000 HC RX 637 (ALT 250 FOR IP): Performed by: PSYCHIATRY & NEUROLOGY

## 2022-03-31 PROCEDURE — 99232 SBSQ HOSP IP/OBS MODERATE 35: CPT | Performed by: NURSE PRACTITIONER

## 2022-03-31 PROCEDURE — 1240000000 HC EMOTIONAL WELLNESS R&B

## 2022-03-31 PROCEDURE — 6370000000 HC RX 637 (ALT 250 FOR IP): Performed by: NURSE PRACTITIONER

## 2022-03-31 RX ORDER — RISPERIDONE 2 MG/1
2 TABLET, ORALLY DISINTEGRATING ORAL 2 TIMES DAILY
Status: DISCONTINUED | OUTPATIENT
Start: 2022-03-31 | End: 2022-04-01

## 2022-03-31 RX ADMIN — Medication 3 MG: at 20:45

## 2022-03-31 RX ADMIN — DIVALPROEX SODIUM 500 MG: 500 TABLET, DELAYED RELEASE ORAL at 09:28

## 2022-03-31 RX ADMIN — DIVALPROEX SODIUM 500 MG: 500 TABLET, DELAYED RELEASE ORAL at 20:45

## 2022-03-31 RX ADMIN — RISPERIDONE 2 MG: 2 TABLET, ORALLY DISINTEGRATING ORAL at 20:45

## 2022-03-31 ASSESSMENT — PAIN SCALES - GENERAL
PAINLEVEL_OUTOF10: 0
PAINLEVEL_OUTOF10: 0

## 2022-03-31 NOTE — PLAN OF CARE
Pt awake on and off during HS rounds. Paranoid and asks person rounding to knock before opening door. Maintained control of his behavior. Remains on close observation.

## 2022-03-31 NOTE — PROGRESS NOTES
Patient denies Si,Hi, or any Hallucinations at this time. Denies depression or anxiety. He is delayed in speech, flat, sad, depressed and expressionless. He is cooperative in his care. Paranoid with eating on the unit and takes his tray to his room. Has been out to groups but at far. To self. Takes meds. Will continue to monitor.

## 2022-03-31 NOTE — PROGRESS NOTES
Attended afternoon meet and greet. Updated on staffing and evening expectations. Present for [facility].

## 2022-03-31 NOTE — PLAN OF CARE
Problem: Falls - Risk of:  Goal: Will remain free from falls  Description: Will remain free from falls  3/31/2022 1132 by Linda Rodas RN  Outcome: Ongoing  3/31/2022 0441 by Nasario Galeazzi, RN  Outcome: Met This Shift  Goal: Absence of physical injury  Description: Absence of physical injury  3/31/2022 1132 by Linda Rodas RN  Outcome: Ongoing  3/31/2022 0441 by Nasario Galeazzi, RN  Outcome: Met This Shift     Problem: Altered Mood, Psychotic Behavior:  Goal: Able to demonstrate trust by eating, participating in treatment and following staff's direction  Description: Able to demonstrate trust by eating, participating in treatment and following staff's direction  3/31/2022 1132 by Linda Rodas RN  Outcome: Ongoing  3/31/2022 0441 by Nasario Galeazzi, RN  Outcome: Ongoing  Goal: Able to verbalize decrease in frequency and intensity of hallucinations  Description: Able to verbalize decrease in frequency and intensity of hallucinations  3/31/2022 1132 by Linda Rodas RN  Outcome: Ongoing  3/31/2022 0441 by Nasario Galeazzi, RN  Outcome: Ongoing  Goal: Able to verbalize reality based thinking  Description: Able to verbalize reality based thinking  3/31/2022 1132 by Linda Rodas RN  Outcome: Ongoing  3/31/2022 0441 by Nasario Galeazzi, RN  Outcome: Ongoing  Goal: Absence of self-harm  Description: Absence of self-harm  3/31/2022 1132 by Linda Rodas RN  Outcome: Ongoing  3/31/2022 0441 by Nasario Galeazzi, RN  Outcome: Met This Shift  Goal: Ability to achieve adequate nutritional intake will improve  Description: Ability to achieve adequate nutritional intake will improve  3/31/2022 1132 by Linda Rodas RN  Outcome: Ongoing  3/31/2022 0441 by Nasario Galeazzi, RN  Outcome: Ongoing  Goal: Ability to interact with others will improve  Description: Ability to interact with others will improve  3/31/2022 1132 by Linda Rodas RN  Outcome: Ongoing  3/31/2022 0441 by Nasario Galeazzi, RN  Outcome: Ongoing  Goal: Compliance with prescribed medication regimen will improve  Description: Compliance with prescribed medication regimen will improve  3/31/2022 1132 by Fe Bender RN  Outcome: Ongoing  3/31/2022 0441 by Jj Doty RN  Outcome: Ongoing  Goal: Patient specific goal  Description: Patient specific goal  Outcome: Ongoing     Problem: Pain:  Goal: Pain level will decrease  Description: Pain level will decrease  Outcome: Ongoing  Goal: Control of acute pain  Description: Control of acute pain  Outcome: Ongoing  Goal: Control of chronic pain  Description: Control of chronic pain  Outcome: Ongoing     Problem: Nutritional:  Goal: Ability to tolerate tube feedings without aspirating will improve  Description: Ability to tolerate tube feedings without aspirating will improve  Outcome: Ongoing  Goal: Consumption of food in small portions  Description: Consumption of food in small portions  Outcome: Ongoing  Goal: Consumption of liquid of appropriate consistency  Description: Consumption of liquid of appropriate consistency  Outcome: Ongoing

## 2022-03-31 NOTE — PLAN OF CARE
Problem: Falls - Risk of:  Goal: Will remain free from falls  Description: Will remain free from falls  3/31/2022 1945 by Alex Crenshaw RN  Outcome: Ongoing     Problem: Altered Mood, Psychotic Behavior:  Goal: Able to demonstrate trust by eating, participating in treatment and following staff's direction  Description: Able to demonstrate trust by eating, participating in treatment and following staff's direction  3/31/2022 1945 by Alex Crenshaw RN  Outcome: Ongoing     Problem: Altered Mood, Psychotic Behavior:  Goal: Able to verbalize decrease in frequency and intensity of hallucinations  Description: Able to verbalize decrease in frequency and intensity of hallucinations  3/31/2022 1945 by Alex Crenshaw RN  Outcome: Ongoing     Problem: Altered Mood, Psychotic Behavior:  Goal: Absence of self-harm  Description: Absence of self-harm  3/31/2022 1945 by Alex Crenshaw RN  Outcome: Ongoing     Problem: Altered Mood, Psychotic Behavior:  Goal: Ability to interact with others will improve  Description: Ability to interact with others will improve  3/31/2022 1945 by Alex Crenshaw RN  Outcome: Ongoing       Pt is withdrawn to bed this evening. Pt is flat and depressed, calm and cooperative with good eye contact. Pt denies any thoughts of suicide or homicide at this time and no dangerous behavior is noted. Pt denies any visual or auditory hallucinations and no delusional thinking is noted.

## 2022-03-31 NOTE — CARE COORDINATION
Sw spoke with pt. Pt reports feeling alright today, denied SI/HI/AVH. Sw asked pt about his auditory hallucinations and when the last time he had them was. Pt became slightly irritated and stated he doesn't like to think about the past. Pt appears guarded, internally stimulated, blunt, intense eye contact.

## 2022-03-31 NOTE — PROGRESS NOTES
BEHAVIORAL HEALTH FOLLOW-UP NOTE     3/31/2022     Patient was seen and examined in person, Chart reviewed   Patient's case discussed with staff/team    Chief Complaint: Paranoid appears internally stimulated    Interim History: Patient seen in his room he appears guarded and paranoid and internally stimulated has some underlying irritability denies SI/HI intent or plan denies auditory visualizations he is discharge focused states the meds are \"working pretty good. \"  Denies auditory visualizations but does appear to be internally preoccupied tends and irritable      Appetite:   [x] Normal/Unchanged  [] Increased  [] Decreased      Sleep:       [x] Normal/Unchanged  [] Fair       [] Poor              Energy:    [x] Normal/Unchanged  [] Increased  [] Decreased        SI [] Present  [x] Absent    HI  []Present  [x] Absent     Aggression:  [] yes  [x] no    Patient is [x] able  [] unable to CONTRACT FOR SAFETY     PAST MEDICAL/PSYCHIATRIC HISTORY:   Past Medical History:   Diagnosis Date    Smoking        FAMILY/SOCIAL HISTORY:  No family history on file.   Social History     Socioeconomic History    Marital status: Single     Spouse name: Not on file    Number of children: 1    Years of education: 9    Highest education level: Not on file   Occupational History    Not on file   Tobacco Use    Smoking status: Never Smoker    Smokeless tobacco: Never Used    Tobacco comment: non smoker   Vaping Use    Vaping Use: Never used   Substance and Sexual Activity    Alcohol use: Not Currently    Drug use: Yes     Types: Marijuana Norval Remak)    Sexual activity: Never     Partners: Female   Other Topics Concern    Not on file   Social History Narrative    Not on file     Social Determinants of Health     Financial Resource Strain:     Difficulty of Paying Living Expenses: Not on file   Food Insecurity:     Worried About Running Out of Food in the Last Year: Not on file    Zach of Food in the Last Year: Not on file Transportation Needs:     Lack of Transportation (Medical): Not on file    Lack of Transportation (Non-Medical): Not on file   Physical Activity:     Days of Exercise per Week: Not on file    Minutes of Exercise per Session: Not on file   Stress:     Feeling of Stress : Not on file   Social Connections:     Frequency of Communication with Friends and Family: Not on file    Frequency of Social Gatherings with Friends and Family: Not on file    Attends Jehovah's witness Services: Not on file    Active Member of [address] [place] [address] [employer] or Organizations: Not on file    Attends Club or Organization Meetings: Not on file    Marital Status: Not on file   Intimate Partner Violence:     Fear of Current or Ex-Partner: Not on file    Emotionally Abused: Not on file    Physically Abused: Not on file    Sexually Abused: Not on file   Housing Stability:     Unable to Pay for Housing in the Last Year: Not on file    Number of [place] in the Last Year: Not on file    Unstable Housing in the Last Year: Not on file           ROS:  [x] All negative/unchanged except if checked.  Explain positive(checked items) below:  [] Constitutional  [] Eyes  [] Ear/Nose/Mouth/Throat  [] Respiratory  [] CV  [] GI  []   [] Musculoskeletal  [] Skin/Breast  [] Neurological  [] Endocrine  [] Heme/Lymph  [] Allergic/Immunologic    Explanation:     MEDICATIONS:    Current Facility-Administered Medications:     risperiDONE (RISPERDAL M-TABS) disintegrating tablet 2 mg, 2 mg, Oral, BID, ASHLYN Chacon - CNP    divalproex (DEPAKOTE) DR tablet 500 mg, 500 mg, Oral, 2 times per day, ASHLYN Rogers - CNP, 599 mg at 03/30/22 2035    acetaminophen (TYLENOL) tablet 650 mg, 650 mg, Oral, Q6H PRN, Juni Martin MD    magnesium hydroxide (MILK OF MAGNESIA) 400 MG/5ML suspension 30 mL, 30 mL, Oral, Daily PRN, Juni Martin MD    aluminum & magnesium hydroxide-simethicone (MAALOX) 607-422-88 MG/5ML suspension 30 mL, 30 mL, Oral, PRN, Magdalena Cano MD Roxanne    haloperidol (HALDOL) tablet 5 mg, 5 mg, Oral, Q6H PRN **OR** haloperidol lactate (HALDOL) injection 5 mg, 5 mg, IntraMUSCular, Q6H PRN, Tabitha Scott MD    melatonin tablet 3 mg, 3 mg, Oral, Nightly, Tabitha Scott MD, 3 mg at 03/30/22 2035    hydrOXYzine (VISTARIL) capsule 50 mg, 50 mg, Oral, TID PRN, Tabitha Scott MD      Examination:  /89   Pulse 97   Temp 97.4 °F (36.3 °C)   Resp 14   Ht 5' 8\" (1.727 m)   Wt 210 lb (95.3 kg)   SpO2 100%   BMI 31.93 kg/m²   Gait - steady  Medication side effects(SE): Denies    Mental Status Examination:    Level of consciousness:  within normal limits   Appearance:  fair grooming and fair hygiene  Behavior/Motor:  no abnormalities noted  Attitude toward examiner:  cooperative  Speech:  spontaneous, normal rate and normal volume   Mood: \" I am okay. \"  Affect: Irritable easily agitated   Thought processes: Linear without flight of ideas loose associations  Thought content: Appears internally stimulated and paranoid denies SI/HI intent or plan denies auditory visual hallucinations but appears internally preoccupied  Cognition:  oriented to person, place, and time   Concentration intact  Insight poor   Judgement poor     ASSESSMENT:   Patient symptoms are:  [] Well controlled  [] Improving  [] Worsening  [x] No change      Diagnosis:   Principal Problem:    Schizophrenia, paranoid ([address]  Resolved Problems:    * No resolved hospital problems.  *      LABS:    Recent Labs     03/29/22  1200   WBC 6.5   HGB 11.2*        Recent Labs     03/29/22  1200      K 3.4*      CO2 24   BUN 12   CREATININE 0.9   GLUCOSE 128*     Recent Labs     03/29/22  1200   BILITOT 0.2   ALKPHOS 71   AST 16   ALT 16     Lab Results   Component Value Date    LABAMPH NOT DETECTED 03/29/2022    BARBSCNU NOT DETECTED 03/29/2022    LABBENZ NOT DETECTED 03/29/2022    LABMETH NOT DETECTED 03/29/2022    OPIATESCREENURINE NOT DETECTED 03/29/2022 PHENCYCLIDINESCREENURINE NOT DETECTED 03/29/2022    ETOH <10 03/29/2022     No results found for: TSH, FREET4  No results found for: LITHIUM  Lab Results   Component Value Date    VALPROATE 3 (L) 03/29/2022       Treatment Plan:  Reviewed current Medications with the patient. Risks, benefits, side effects, drug-to-drug interactions and alternatives to treatment were discussed. Collateral information:   CD evaluation  Encourage patient to attend group and other milieu activities.   Discharge planning discussed with the patient and treatment team.    Increase Depakote to 2 mg twice daily plan for Perseris injection  Continue Depakote 5 mg twice daily for mood stabilization    PSYCHOTHERAPY/COUNSELING:  [x] Therapeutic interview  [x] Supportive  [] CBT  [] Ongoing  [] Other    [x] Patient continues to need, on a daily basis, active treatment furnished directly by or requiring the supervision of inpatient psychiatric personnel      Anticipated Length of stay: 3 to 7 days based on stability            Electronically signed by ASHLYN Jordan CNP on 4/37/1864 at 9:12 AM

## 2022-04-01 PROCEDURE — 1240000000 HC EMOTIONAL WELLNESS R&B

## 2022-04-01 PROCEDURE — 99232 SBSQ HOSP IP/OBS MODERATE 35: CPT | Performed by: NURSE PRACTITIONER

## 2022-04-01 PROCEDURE — 6370000000 HC RX 637 (ALT 250 FOR IP): Performed by: NURSE PRACTITIONER

## 2022-04-01 RX ORDER — RISPERIDONE 2 MG/1
2 TABLET, ORALLY DISINTEGRATING ORAL DAILY
Status: DISCONTINUED | OUTPATIENT
Start: 2022-04-02 | End: 2022-04-04

## 2022-04-01 RX ADMIN — DIVALPROEX SODIUM 500 MG: 500 TABLET, DELAYED RELEASE ORAL at 09:05

## 2022-04-01 NOTE — PROGRESS NOTES
BEHAVIORAL HEALTH FOLLOW-UP NOTE     4/1/2022     Patient was seen and examined in person, Chart reviewed   Patient's case discussed with staff/team    Chief Complaint: Paranoid appears internally stimulated    Interim History: Patient seen during treatment team he angeilque delayed appears internally stimulated and psychotic. He states he is still hearing voices telling him that he is lennon and a bitch and to throw himself out the window. He is isolative to his room extremely paranoid thought blocking denies SI/HI intent or plan    Appetite:   [x] Normal/Unchanged  [] Increased  [] Decreased      Sleep:       [x] Normal/Unchanged  [] Fair       [] Poor              Energy:    [x] Normal/Unchanged  [] Increased  [] Decreased        SI [] Present  [x] Absent    HI  []Present  [x] Absent     Aggression:  [] yes  [x] no    Patient is [x] able  [] unable to CONTRACT FOR SAFETY     PAST MEDICAL/PSYCHIATRIC HISTORY:   Past Medical History:   Diagnosis Date    Smoking        FAMILY/SOCIAL HISTORY:  No family history on file.   Social History     Socioeconomic History    Marital status: Single     Spouse name: Not on file    Number of children: 1    Years of education: 9    Highest education level: Not on file   Occupational History    Not on file   Tobacco Use    Smoking status: Never Smoker    Smokeless tobacco: Never Used    Tobacco comment: non smoker   Vaping Use    Vaping Use: Never used   Substance and Sexual Activity    Alcohol use: Not Currently    Drug use: Yes     Types: Marijuana Charlaalden Choco)    Sexual activity: Never     Partners: Female   Other Topics Concern    Not on file   Social History Narrative    Not on file     Social Determinants of Health     Financial Resource Strain:     Difficulty of Paying Living Expenses: Not on file   Food Insecurity:     Worried About Running Out of Food in the Last Year: Not on file    Zach of Food in the Last Year: Not on file   Transportation Needs:     Lack of Transportation (Medical): Not on file    Lack of Transportation (Non-Medical): Not on file   Physical Activity:     Days of Exercise per Week: Not on file    Minutes of Exercise per Session: Not on file   Stress:     Feeling of Stress : Not on file   Social Connections:     Frequency of Communication with Friends and Family: Not on file    Frequency of Social Gatherings with Friends and Family: Not on file    Attends [employer] Services: Not on file    Active Member of [address] [place] [address] or Organizations: Not on file    Attends Club or Organization Meetings: Not on file    Marital Status: Not on file   Intimate Partner Violence:     Fear of Current or Ex-Partner: Not on file    Emotionally Abused: Not on file    Physically Abused: Not on file    Sexually Abused: Not on file   Housing Stability:     Unable to Pay for Housing in the Last Year: Not on file    Number of [place] in the Last Year: Not on file    Unstable Housing in the Last Year: Not on file           ROS:  [x] All negative/unchanged except if checked.  Explain positive(checked items) below:  [] Constitutional  [] Eyes  [] Ear/Nose/Mouth/Throat  [] Respiratory  [] CV  [] GI  []   [] Musculoskeletal  [] Skin/Breast  [] Neurological  [] Endocrine  [] Heme/Lymph  [] Allergic/Immunologic    Explanation:     MEDICATIONS:    Current Facility-Administered Medications:     risperiDONE (RISPERDAL M-TABS) disintegrating tablet 2 mg, 2 mg, Oral, BID, Charyl Bamberger Dellick, APRN - CNP, 2 mg at 03/31/22 2045    divalproex (DEPAKOTE) DR tablet 500 mg, 500 mg, Oral, 2 times per day, ASHLYN Quesada - CNP, 836 mg at 03/31/22 2045    acetaminophen (TYLENOL) tablet 650 mg, 650 mg, Oral, Q6H PRN, Maribel Bhatia MD    magnesium hydroxide (MILK OF MAGNESIA) 400 MG/5ML suspension 30 mL, 30 mL, Oral, Daily PRN, Maribel Bhatia MD    aluminum & magnesium hydroxide-simethicone (MAALOX) 200-200-20 MG/5ML suspension 30 mL, 30 mL, Oral, PRN, MD Johnson Patel haloperidol (HALDOL) tablet 5 mg, 5 mg, Oral, Q6H PRN **OR** haloperidol lactate (HALDOL) injection 5 mg, 5 mg, IntraMUSCular, Q6H PRN, Scar Valladares MD    melatonin tablet 3 mg, 3 mg, Oral, Nightly, Scar Valladares MD, 3 mg at 03/31/22 2045    hydrOXYzine (VISTARIL) capsule 50 mg, 50 mg, Oral, TID PRN, Scar Valladares MD      Examination:  BP (!) 199/79   Pulse 101   Temp 97.7 °F (36.5 °C) (Tympanic)   Resp 16   Ht 5' 8\" (1.727 m)   Wt 210 lb (95.3 kg)   SpO2 100%   BMI 31.93 kg/m²   Gait - steady  Medication side effects(SE): Denies    Mental Status Examination:    Level of consciousness:  within normal limits   Appearance:  fair grooming and fair hygiene  Behavior/Motor:  no abnormalities noted  Attitude toward examiner:  cooperative  Speech:  spontaneous, normal rate and normal volume   Mood: \" I am okay. \"  Affect: Irritable easily agitated   Thought processes: Linear without flight of ideas loose associations  Thought content: Appears internally stimulated and paranoid denies SI/HI intent or plan denies auditory visual hallucinations but appears internally preoccupied  Cognition:  oriented to person, place, and time   Concentration intact  Insight poor   Judgement poor     ASSESSMENT:   Patient symptoms are:  [] Well controlled  [] Improving  [] Worsening  [x] No change      Diagnosis:   Principal Problem:    Schizophrenia, paranoid ([address]  Resolved Problems:    * No resolved hospital problems.  *      LABS:    Recent Labs     03/29/22  1200   WBC 6.5   HGB 11.2*        Recent Labs     03/29/22  1200      K 3.4*      CO2 24   BUN 12   CREATININE 0.9   GLUCOSE 128*     Recent Labs     03/29/22  1200   BILITOT 0.2   ALKPHOS 71   AST 16   ALT 16     Lab Results   Component Value Date    LABAMPH NOT DETECTED 03/29/2022    BARBSCNU NOT DETECTED 03/29/2022    LABBENZ NOT DETECTED 03/29/2022    LABMETH NOT DETECTED 03/29/2022    OPIATESCREENURINE NOT DETECTED 03/29/2022 PHENCYCLIDINESCREENURINE NOT DETECTED 03/29/2022    ETOH <10 03/29/2022     No results found for: TSH, FREET4  No results found for: LITHIUM  Lab Results   Component Value Date    VALPROATE 3 (L) 03/29/2022       Treatment Plan:  Reviewed current Medications with the patient. Risks, benefits, side effects, drug-to-drug interactions and alternatives to treatment were discussed. Collateral information:   CD evaluation  Encourage patient to attend group and other milieu activities.   Discharge planning discussed with the patient and treatment team.    Increase Risperdal 2 mg daily and 3 mg at bedtime plan for Perseris injection  Continue Depakote 5 mg twice daily for mood stabilization    PSYCHOTHERAPY/COUNSELING:  [x] Therapeutic interview  [x] Supportive  [] CBT  [] Ongoing  [] Other    [x] Patient continues to need, on a daily basis, active treatment furnished directly by or requiring the supervision of inpatient psychiatric personnel      Anticipated Length of stay: 3 to 7 days based on stability            Electronically signed by ASLHYN Cash CNP on 8/8/4814 at 8:32 AM

## 2022-04-01 NOTE — PLAN OF CARE
Problem: Falls - Risk of:  Goal: Will remain free from falls  Description: Will remain free from falls  Outcome: Ongoing  Goal: Absence of physical injury  Description: Absence of physical injury  Outcome: Ongoing     Problem: Altered Mood, Psychotic Behavior:  Goal: Able to demonstrate trust by eating, participating in treatment and following staff's direction  Description: Able to demonstrate trust by eating, participating in treatment and following staff's direction  Outcome: Ongoing  Goal: Able to verbalize decrease in frequency and intensity of hallucinations  Description: Able to verbalize decrease in frequency and intensity of hallucinations  Outcome: Ongoing  Goal: Able to verbalize reality based thinking  Description: Able to verbalize reality based thinking  Outcome: Ongoing  Goal: Absence of self-harm  Description: Absence of self-harm  Outcome: Ongoing  Goal: Ability to achieve adequate nutritional intake will improve  Description: Ability to achieve adequate nutritional intake will improve  Outcome: Ongoing  Goal: Ability to interact with others will improve  Description: Ability to interact with others will improve  Outcome: Ongoing  Goal: Compliance with prescribed medication regimen will improve  Description: Compliance with prescribed medication regimen will improve  Outcome: Ongoing  Goal: Patient specific goal  Description: Patient specific goal  Outcome: Ongoing     Problem: Pain:  Goal: Pain level will decrease  Description: Pain level will decrease  Outcome: Ongoing  Goal: Control of acute pain  Description: Control of acute pain  Outcome: Ongoing  Goal: Control of chronic pain  Description: Control of chronic pain  Outcome: Ongoing     Problem: Nutritional:  Goal: Ability to tolerate tube feedings without aspirating will improve  Description: Ability to tolerate tube feedings without aspirating will improve  Outcome: Ongoing  Goal: Consumption of food in small portions  Description: Consumption of food in small portions  Outcome: Ongoing  Goal: Consumption of liquid of appropriate consistency  Description: Consumption of liquid of appropriate consistency  Outcome: Ongoing

## 2022-04-01 NOTE — PROGRESS NOTES
Patient denies SI,HI, but states he hears voices during team meeting. The voices tell him he is Chela Sunshine, a bitch, and to throw him out as patient states. Attends groups and takes his meds. Delayed responses at times. Patient not wanting to sign voluntary form, states he wants the Dr to talk to him about it. States noises on the unit bother him and that he is an introvert and doesn't like to be around others. Will continue to monitor.

## 2022-04-01 NOTE — CARE COORDINATION
Pt was seen in treatment team. He presents as internally preoccuied with blunt affect, continues to report that he is hearing voices calling him \"lennon\" and \"bitch\", states \"throwing him out the window\", could not clarify this statement.  Sw to continue to assist.

## 2022-04-01 NOTE — PROGRESS NOTES
Group Therapy Note    Date: 4/1/2022  Start Time: 10:00  End Time: 10:45  Number of Participants:12    Type of Group: Psychoeducation    Wellness Binder Information  Module Name: Steps to wellness      Patient's Goal: To id positive steps to wellness recovery. Notes: Attended group and was able to participate. Status After Intervention:  Unchanged    Participation Level:  Active Listener    Participation Quality: Attentive      Speech:  hesitant      Thought Process/Content: Linear      Affective Functioning: Flat      Mood: depressed      Level of consciousness:  Alert      Response to Learning: Progressing to goal      Endings: None Reported    Modes of Intervention: Education and Support      Discipline Responsible: Psychoeducational Specialist      Signature:  YANI Ramirez

## 2022-04-02 PROCEDURE — 6370000000 HC RX 637 (ALT 250 FOR IP): Performed by: NURSE PRACTITIONER

## 2022-04-02 PROCEDURE — 1240000000 HC EMOTIONAL WELLNESS R&B

## 2022-04-02 PROCEDURE — 99232 SBSQ HOSP IP/OBS MODERATE 35: CPT | Performed by: NURSE PRACTITIONER

## 2022-04-02 PROCEDURE — 6370000000 HC RX 637 (ALT 250 FOR IP): Performed by: PSYCHIATRY & NEUROLOGY

## 2022-04-02 RX ADMIN — DIVALPROEX SODIUM 500 MG: 500 TABLET, DELAYED RELEASE ORAL at 21:54

## 2022-04-02 RX ADMIN — Medication 3 MG: at 21:54

## 2022-04-02 ASSESSMENT — PAIN SCALES - GENERAL: PAINLEVEL_OUTOF10: 0

## 2022-04-02 NOTE — GROUP NOTE
Group Therapy Note    Date: 4/2/2022    Group Start Time: 1100  Group End Time: 1130  Group Topic: Cognitive Skills    [facility]    INGE Kumar, YANI        Group Therapy Note    Attendees: 12         Patient's Goal: To participate in group discussion on setting boundaries. Notes: pt was an active listener in group discussion. Pt sat on the outside of the group. Status After Intervention:  Unchanged    Participation Level:  Active Listener    Participation Quality: Attentive      Speech:  normal      Thought Process/Content: Logical      Affective Functioning: Congruent      Mood: anxious      Level of consciousness:  Alert and Oriented x4      Response to Learning: Able to verbalize current knowledge/experience      Endings: None Reported    Modes of Intervention: Education, Support, Socialization, Exploration, Clarifying and Problem-solving      Discipline Responsible: /Counselor      Signature:  Kirk Claude, MSW, YANI

## 2022-04-02 NOTE — PROGRESS NOTES
BEHAVIORAL HEALTH FOLLOW-UP NOTE     4/2/2022     Patient was seen and examined in person, Chart reviewed   Patient's case discussed with staff/team    Chief Complaint: Preoccupied    Interim History: Patient upon the unit appears to be preoccupied paranoid staff voices no thought blocking however he does appear to be responding somewhat quicker today. Still reports auditory hallucinations but states he is not able to make out what they are saying to him. He denies SI/HI intent or plan he was out interacting with peers history of evening shift seems to be improving slowly        Appetite:   [x] Normal/Unchanged  [] Increased  [] Decreased      Sleep:       [x] Normal/Unchanged  [] Fair       [] Poor              Energy:    [x] Normal/Unchanged  [] Increased  [] Decreased        SI [] Present  [x] Absent    HI  []Present  [x] Absent     Aggression:  [] yes  [x] no    Patient is [x] able  [] unable to CONTRACT FOR SAFETY     PAST MEDICAL/PSYCHIATRIC HISTORY:   Past Medical History:   Diagnosis Date    Smoking        FAMILY/SOCIAL HISTORY:  No family history on file.   Social History     Socioeconomic History    Marital status: Single     Spouse name: Not on file    Number of children: 1    Years of education: 9    Highest education level: Not on file   Occupational History    Not on file   Tobacco Use    Smoking status: Never Smoker    Smokeless tobacco: Never Used    Tobacco comment: non smoker   Vaping Use    Vaping Use: Never used   Substance and Sexual Activity    Alcohol use: Not Currently    Drug use: Yes     Types: Marijuana Elfida Roldan)    Sexual activity: Never     Partners: Female   Other Topics Concern    Not on file   Social History Narrative    Not on file     Social Determinants of Health     Financial Resource Strain:     Difficulty of Paying Living Expenses: Not on file   Food Insecurity:     Worried About Running Out of Food in the Last Year: Not on file    [address] in the Last Year: Not on file   Transportation Needs:     Lack of Transportation (Medical): Not on file    Lack of Transportation (Non-Medical): Not on file   Physical Activity:     Days of Exercise per Week: Not on file    Minutes of Exercise per Session: Not on file   Stress:     Feeling of Stress : Not on file   Social Connections:     Frequency of Communication with Friends and Family: Not on file    Frequency of Social Gatherings with Friends and Family: Not on file    Attends [employer] Services: Not on file    Active Member of [address] [place] [address] [employer] or Organizations: Not on file    Attends Club or Organization Meetings: Not on file    Marital Status: Not on file   Intimate Partner Violence:     Fear of Current or Ex-Partner: Not on file    Emotionally Abused: Not on file    Physically Abused: Not on file    Sexually Abused: Not on file   Housing Stability:     Unable to Pay for Housing in the Last Year: Not on file    Number of [place] in the Last Year: Not on file    Unstable Housing in the Last Year: Not on file           ROS:  [x] All negative/unchanged except if checked.  Explain positive(checked items) below:  [] Constitutional  [] Eyes  [] Ear/Nose/Mouth/Throat  [] Respiratory  [] CV  [] GI  []   [] Musculoskeletal  [] Skin/Breast  [] Neurological  [] Endocrine  [] Heme/Lymph  [] Allergic/Immunologic    Explanation:     MEDICATIONS:    Current Facility-Administered Medications:     risperiDONE (RISPERDAL M-TABS) disintegrating tablet 2 mg, 2 mg, Oral, Daily, ASHLYN Chacon CNP    risperiDONE (RISPERDAL M-TABS) disintegrating tablet 3 mg, 3 mg, Oral, Nightly, ASHLYN Chacon CNP    divalproex (DEPAKOTE) DR tablet 500 mg, 500 mg, Oral, 2 times per day, ASHLYN Miller CNP, 678 mg at 04/01/22 0905    acetaminophen (TYLENOL) tablet 650 mg, 650 mg, Oral, Q6H PRN, Marlen Brasher MD    magnesium hydroxide (MILK OF MAGNESIA) 400 MG/5ML suspension 30 mL, 30 mL, Oral, Daily PRN, Val Buck MD Roxanne    aluminum & magnesium hydroxide-simethicone (MAALOX) 200-200-20 MG/5ML suspension 30 mL, 30 mL, Oral, PRN, Scar Valladares MD    haloperidol (HALDOL) tablet 5 mg, 5 mg, Oral, Q6H PRN **OR** haloperidol lactate (HALDOL) injection 5 mg, 5 mg, IntraMUSCular, Q6H PRN, Scar Valladares MD    melatonin tablet 3 mg, 3 mg, Oral, Nightly, Scar Valladares MD, 3 mg at 03/31/22 2045    hydrOXYzine (VISTARIL) capsule 50 mg, 50 mg, Oral, TID PRN, Scar Valladares MD      Examination:  BP (!) 199/79   Pulse 101   Temp 97.7 °F (36.5 °C) (Tympanic)   Resp 16   Ht 5' 8\" (1.727 m)   Wt 210 lb (95.3 kg)   SpO2 100%   BMI 31.93 kg/m²   Gait - steady  Medication side effects(SE): Denies    Mental Status Examination:    Level of consciousness:  within normal limits   Appearance:  fair grooming and fair hygiene  Behavior/Motor:  no abnormalities noted  Attitude toward examiner:  cooperative  Speech:  spontaneous, normal rate and normal volume   Mood: \" I am okay. \"  Affect: Irritable easily agitated   Thought processes: Linear without flight of ideas loose associations  Thought content: Appears internally stimulated and paranoid denies SI/HI intent or plan denies auditory visual hallucinations but appears internally preoccupied  Cognition:  oriented to person, place, and time   Concentration intact  Insight poor   Judgement poor     ASSESSMENT:   Patient symptoms are:  [] Well controlled  [] Improving  [] Worsening  [x] No change      Diagnosis:   Principal Problem:    Schizophrenia, paranoid ([facility]  Resolved Problems:    * No resolved hospital problems. *      LABS:    No results for input(s): WBC, HGB, PLT in the last 72 hours. No results for input(s): NA, K, CL, CO2, BUN, CREATININE, GLUCOSE in the last 72 hours. No results for input(s): BILITOT, ALKPHOS, AST, ALT in the last 72 hours.   Lab Results   Component Value Date    LABAMPH NOT DETECTED 03/29/2022    BARBSCNU NOT DETECTED 03/29/2022    LABBENZ NOT DETECTED 03/29/2022    LABMETH NOT DETECTED 03/29/2022    OPIATESCREENURINE NOT DETECTED 03/29/2022    PHENCYCLIDINESCREENURINE NOT DETECTED 03/29/2022    ETOH <10 03/29/2022     No results found for: TSH, FREET4  No results found for: LITHIUM  Lab Results   Component Value Date    VALPROATE 3 (L) 03/29/2022       Treatment Plan:  Reviewed current Medications with the patient. Risks, benefits, side effects, drug-to-drug interactions and alternatives to treatment were discussed. Collateral information:   CD evaluation  Encourage patient to attend group and other milieu activities.   Discharge planning discussed with the patient and treatment team.    Increase Risperdal 2 mg daily and 3 mg at bedtime plan for Perseris injection  Continue Depakote 500 mg twice daily for mood stabilization    PSYCHOTHERAPY/COUNSELING:  [x] Therapeutic interview  [x] Supportive  [] CBT  [] Ongoing  [] Other    [x] Patient continues to need, on a daily basis, active treatment furnished directly by or requiring the supervision of inpatient psychiatric personnel      Anticipated Length of stay: 3 to 7 days based on stability            Electronically signed by ASHLYN Raines CNP on 5/8/9544 at 11:37 AM

## 2022-04-02 NOTE — PLAN OF CARE
Problem: Falls - Risk of:  Goal: Will remain free from falls  Description: Will remain free from falls  4/1/2022 2107 by Kaitlin Izquierdo RN  Outcome: Ongoing     Problem: Altered Mood, Psychotic Behavior:  Goal: Able to demonstrate trust by eating, participating in treatment and following staff's direction  Description: Able to demonstrate trust by eating, participating in treatment and following staff's direction  4/1/2022 2107 by Kaitlin Izquierdo RN  Outcome: Ongoing     Problem: Altered Mood, Psychotic Behavior:  Goal: Able to verbalize decrease in frequency and intensity of hallucinations  Description: Able to verbalize decrease in frequency and intensity of hallucinations  4/1/2022 2107 by Kaitlin Izquierdo RN  Outcome: Ongoing     Problem: Altered Mood, Psychotic Behavior:  Goal: Able to verbalize reality based thinking  Description: Able to verbalize reality based thinking  4/1/2022 2107 by Kaitlin Izquierdo RN  Outcome: Ongoing     Problem: Altered Mood, Psychotic Behavior:  Goal: Ability to interact with others will improve  Description: Ability to interact with others will improve  4/1/2022 2107 by Kaitlin Izquierdo RN  Outcome: Ongoing     Pt has been up on the unit interacting with other patients. Pt is alert and oriented x 3, calm and cooperative with good eye contact. Pt denies any thoughts of suicide or homicide at this time and no dangerous behavior is noted. Pt denies any visual or auditory hallucinations and no delusional thinking is noted.

## 2022-04-02 NOTE — BH NOTE
Pt is stable at tis time. Pt is isolative to room. Pt has a flat affect, blank stare at times, thought blocking on occassion. Pt denies suicidal / homicidal ideations. Pt reports hearing voices to kill self. Pt states he will not act on those voices and does at this time, contract for safety. Pt denies visual disturbances at this time. No other other distress assessed. Will monitor and follow.

## 2022-04-02 NOTE — PROGRESS NOTES
[facility]  Day 3 Interdisciplinary Treatment Plan NOTE    Review Date & Time: 4/1/22 0900    Patient was in treatment team    Estimated Length of Stay Update:  3-5 days  Estimated Discharge Date Update: 3-5 days    EDUCATION:   Learner Progress Toward Treatment Goals: Reviewed results and recommendations of this team    Method: Small group    Outcome: Verbalized understanding    PATIENT GOALS:  Discharge home    PLAN/TREATMENT RECOMMENDATIONS UPDATE: continue current treatment plan and monitor.     GOALS UPDATE:   Time frame for Short-Term Goals: 3-5 days      Candance Ina, RN

## 2022-04-03 PROCEDURE — 99232 SBSQ HOSP IP/OBS MODERATE 35: CPT | Performed by: NURSE PRACTITIONER

## 2022-04-03 PROCEDURE — 1240000000 HC EMOTIONAL WELLNESS R&B

## 2022-04-03 ASSESSMENT — PAIN SCALES - GENERAL: PAINLEVEL_OUTOF10: 0

## 2022-04-03 NOTE — PLAN OF CARE
Problem: Falls - Risk of:  Goal: Will remain free from falls  Description: Will remain free from falls  4/3/2022 1953 by Verena Thornton RN  Outcome: Ongoing     Problem: Falls - Risk of:  Goal: Absence of physical injury  Description: Absence of physical injury  Outcome: Ongoing     Problem: Altered Mood, Psychotic Behavior:  Goal: Able to verbalize decrease in frequency and intensity of hallucinations  Description: Able to verbalize decrease in frequency and intensity of hallucinations  4/3/2022 1953 by Verena Thornton RN  Outcome: Ongoing     Problem: Altered Mood, Psychotic Behavior:  Goal: Able to verbalize reality based thinking  Description: Able to verbalize reality based thinking  Outcome: Ongoing     Problem: Altered Mood, Psychotic Behavior:  Goal: Ability to interact with others will improve  Description: Ability to interact with others will improve  Outcome: Ongoing     Problem: Altered Mood, Psychotic Behavior:  Goal: Patient specific goal  Description: Patient specific goal  Outcome: Ongoing       Pt is withdrawn to his room. Pt continues loose associations, disorganized thoughts and delusional thinking. Pt is alert to name only and is unable to billy on a conversation. Pt stares blankly when asked questions and starts a non sensical rambling. Pt does not appear to be in any distress.

## 2022-04-03 NOTE — PLAN OF CARE
Problem: Altered Mood, Psychotic Behavior:  Goal: Able to demonstrate trust by eating, participating in treatment and following staff's direction  Description: Able to demonstrate trust by eating, participating in treatment and following staff's direction  4/2/2022 2056 by Quang Clark RN  Outcome: Ongoing     Problem: Altered Mood, Psychotic Behavior:  Goal: Able to verbalize decrease in frequency and intensity of hallucinations  Description: Able to verbalize decrease in frequency and intensity of hallucinations  Outcome: Ongoing     Problem: Altered Mood, Psychotic Behavior:  Goal: Absence of self-harm  Description: Absence of self-harm  Outcome: Ongoing     Problem: Altered Mood, Psychotic Behavior:  Goal: Ability to interact with others will improve  Description: Ability to interact with others will improve  Outcome: Ongoing     Problem: Altered Mood, Psychotic Behavior:  Goal: Patient specific goal  Description: Patient specific goal  Outcome: Ongoing   Pt is withdrawn to bed. Pt is calm and cooperative, alert and oriented x 3 with good eye contact. Pt denies any thoughts of suicide or homicide and no dangerous behavior is noted. Pt complaint of continued auditory hallucinations saying he should kill himself. No delusional thinking is noted.

## 2022-04-03 NOTE — PROGRESS NOTES
BEHAVIORAL HEALTH FOLLOW-UP NOTE     4/3/2022     Patient was seen and examined in person, Chart reviewed   Patient's case discussed with staff/team    Chief Complaint: Thought blocking, psychotic internally stimulated refusing medications    Interim History: Patient seen in his room is clearly paranoid thought blocking not able to answer questions completely internally stimulated and psychotic he has been refusing all psychotropic medications he has no insight or judgment    Appetite:   [x] Normal/Unchanged  [] Increased  [] Decreased      Sleep:       [x] Normal/Unchanged  [] Fair       [] Poor              Energy:    [x] Normal/Unchanged  [] Increased  [] Decreased        SI [] Present  [x] Absent    HI  []Present  [x] Absent     Aggression:  [] yes  [x] no    Patient is [x] able  [] unable to CONTRACT FOR SAFETY     PAST MEDICAL/PSYCHIATRIC HISTORY:   Past Medical History:   Diagnosis Date    Smoking        FAMILY/SOCIAL HISTORY:  No family history on file. Social History     Socioeconomic History    Marital status: Single     Spouse name: Not on file    Number of children: 1    Years of education: 9    Highest education level: Not on file   Occupational History    Not on file   Tobacco Use    Smoking status: Never Smoker    Smokeless tobacco: Never Used    Tobacco comment: non smoker   Vaping Use    Vaping Use: Never used   Substance and Sexual Activity    Alcohol use: Not Currently    Drug use: Yes     Types: Marijuana Norval Remak)    Sexual activity: Never     Partners: Female   Other Topics Concern    Not on file   Social History Narrative    Not on file     Social Determinants of Health     Financial Resource Strain:     Difficulty of Paying Living Expenses: Not on file   Food Insecurity:     Worried About Running Out of Food in the Last Year: Not on file    Zach of Food in the Last Year: Not on file   Transportation Needs:     Lack of Transportation (Medical):  Not on file    Lack of Transportation (Non-Medical): Not on file   Physical Activity:     Days of Exercise per Week: Not on file    Minutes of Exercise per Session: Not on file   Stress:     Feeling of Stress : Not on file   Social Connections:     Frequency of Communication with Friends and Family: Not on file    Frequency of Social Gatherings with Friends and Family: Not on file    Attends Mos[employer] Services: Not on file    Active Member of [address] [place] [address] or Organizations: Not on file    Attends Club or Organization Meetings: Not on file    Marital Status: Not on file   Intimate Partner Violence:     Fear of Current or Ex-Partner: Not on file    Emotionally Abused: Not on file    Physically Abused: Not on file    Sexually Abused: Not on file   Housing Stability:     Unable to Pay for Housing in the Last Year: Not on file    Number of [place] in the Last Year: Not on file    Unstable Housing in the Last Year: Not on file           ROS:  [x] All negative/unchanged except if checked.  Explain positive(checked items) below:  [] Constitutional  [] Eyes  [] Ear/Nose/Mouth/Throat  [] Respiratory  [] CV  [] GI  []   [] Musculoskeletal  [] Skin/Breast  [] Neurological  [] Endocrine  [] Heme/Lymph  [] Allergic/Immunologic    Explanation:     MEDICATIONS:    Current Facility-Administered Medications:     risperiDONE (RISPERDAL M-TABS) disintegrating tablet 2 mg, 2 mg, Oral, Daily, ASHLYN Chacon CNP    risperiDONE (RISPERDAL M-TABS) disintegrating tablet 3 mg, 3 mg, Oral, Nightly, ASHLYN Chacon CNP    divalproex (DEPAKOTE) DR tablet 500 mg, 500 mg, Oral, 2 times per day, ASHLYN Carpio CNP, 695 mg at 04/02/22 2154    acetaminophen (TYLENOL) tablet 650 mg, 650 mg, Oral, Q6H PRN, Kimani Valera MD    magnesium hydroxide (MILK OF MAGNESIA) 400 MG/5ML suspension 30 mL, 30 mL, Oral, Daily PRN, Kimani Valera MD    aluminum & magnesium hydroxide-simethicone (MAALOX) 510-190-63 MG/5ML suspension 30 mL, 30 mL, Oral, PRN, Bernard Reynolds MD    haloperidol (HALDOL) tablet 5 mg, 5 mg, Oral, Q6H PRN **OR** haloperidol lactate (HALDOL) injection 5 mg, 5 mg, IntraMUSCular, Q6H PRN, Bernard Reynolds MD    melatonin tablet 3 mg, 3 mg, Oral, Nightly, Bernard Reynolds MD, 3 mg at 04/02/22 2154    hydrOXYzine (VISTARIL) capsule 50 mg, 50 mg, Oral, TID PRN, Bernard Reynolds MD      Examination:  BP (!) 199/79   Pulse 101   Temp 97.7 °F (36.5 °C) (Tympanic)   Resp 16   Ht 5' 8\" (1.727 m)   Wt 210 lb (95.3 kg)   SpO2 100%   BMI 31.93 kg/m²   Gait - steady  Medication side effects(SE): Denies    Mental Status Examination:    Level of consciousness:  within normal limits   Appearance:  fair grooming and fair hygiene  Behavior/Motor:  no abnormalities noted  Attitude toward examiner:  cooperative  Speech:  spontaneous, normal rate and normal volume   Mood: \" I am okay. \"  Affect: Irritable easily agitated   Thought processes: Linear without flight of ideas loose associations  Thought content: Appears internally stimulated and paranoid denies SI/HI intent or plan denies auditory visual hallucinations but appears internally preoccupied  Cognition:  oriented to person, place, and time   Concentration intact  Insight poor   Judgement poor     ASSESSMENT:   Patient symptoms are:  [] Well controlled  [] Improving  [] Worsening  [x] No change      Diagnosis:   Principal Problem:    Schizophrenia, [facility] ([facility]  Resolved Problems:    * No resolved hospital problems. *      LABS:    No results for input(s): WBC, HGB, PLT in the last 72 hours. No results for input(s): NA, K, CL, CO2, BUN, CREATININE, GLUCOSE in the last 72 hours. No results for input(s): BILITOT, ALKPHOS, AST, ALT in the last 72 hours.   Lab Results   Component Value Date    LABAMPH NOT DETECTED 03/29/2022    BARBSCNU NOT DETECTED 03/29/2022    LABBENZ NOT DETECTED 03/29/2022    LABMETH NOT DETECTED 03/29/2022    OPIATESCREENURINE NOT DETECTED 03/29/2022

## 2022-04-03 NOTE — PLAN OF CARE
Pt denies suicidal / homicidal ideations. Pt reports hearing voices. Pt does not report a command at this time associated with the voices. Pt denies visual hallucinations. Pt has a flat affect, poverty of content and engages in thought blocking. Pt does appear to be paranoid about surroundings, minimally leaving room and wanting door in a certain position. Pt does not report a goal during first morning assessment by this RN. Will follow and monitor.

## 2022-04-03 NOTE — BH NOTE
Pt is stable and without immediate distress. Pt appears paranoid. Stares, thought blocks. Pt did not respond to questions asked about suicidal / homicidal ideations, if having hallucinations. Pt only continued to stare. Will follow and monitor.

## 2022-04-04 PROCEDURE — 6370000000 HC RX 637 (ALT 250 FOR IP): Performed by: PSYCHIATRY & NEUROLOGY

## 2022-04-04 PROCEDURE — 99232 SBSQ HOSP IP/OBS MODERATE 35: CPT | Performed by: NURSE PRACTITIONER

## 2022-04-04 PROCEDURE — 6370000000 HC RX 637 (ALT 250 FOR IP): Performed by: NURSE PRACTITIONER

## 2022-04-04 PROCEDURE — 1240000000 HC EMOTIONAL WELLNESS R&B

## 2022-04-04 RX ORDER — OLANZAPINE 5 MG/1
5 TABLET, ORALLY DISINTEGRATING ORAL NIGHTLY
Status: DISCONTINUED | OUTPATIENT
Start: 2022-04-04 | End: 2022-04-14 | Stop reason: HOSPADM

## 2022-04-04 RX ADMIN — DIVALPROEX SODIUM 500 MG: 500 TABLET, DELAYED RELEASE ORAL at 21:43

## 2022-04-04 RX ADMIN — OLANZAPINE 5 MG: 5 TABLET, ORALLY DISINTEGRATING ORAL at 21:43

## 2022-04-04 RX ADMIN — Medication 3 MG: at 21:42

## 2022-04-04 NOTE — PLAN OF CARE
WARREN pt level of orientation, SI, HI, hallucinations, anxiety or depression d/t uncooperative/dimissive of staffs attempts to engage. Pt is isolative to room and self. Pt with even and unlabored breathing, no signs of acute physical distress noted. Will continue to monitor and offer support as needed.     Problem: Altered Mood, Psychotic Behavior:  Goal: Absence of self-harm  Description: Absence of self-harm  Outcome: Met This Shift     Problem: Altered Mood, Psychotic Behavior:  Goal: Able to verbalize reality based thinking  Description: Able to verbalize reality based thinking  Outcome: Ongoing     Problem: Altered Mood, Psychotic Behavior:  Goal: Ability to interact with others will improve  Description: Ability to interact with others will improve  Outcome: Ongoing

## 2022-04-04 NOTE — CARE COORDINATION
Sw spoke with pt. Pt reports he is just trying to relax right now when Sw asked how he was doing. Sw asked pt if he has been taking his medications, pt just stared at [facility] blankly and did not respond. Pt stated he isn't going to groups because he doesn't want to get upset. Sw asked what would cause pt to become upset during group, pt stated because it is loud. Pt stated he is going back to [facility] at discharge. Sw advised pt that his PO would like pt to go to [facility]. Pt stated that he talked with his PO and he said the plan is to go to [facility]. However pt also stated that he doesn't know who is PO is and he hasnt talked with him. Pt is thought blocking, blunt, intense eye contact, minimal responses, poor insight/judgement.

## 2022-04-04 NOTE — BH NOTE
Defiantly refused all scheduled morning medications, stating \"how many times do I gotta tell you people I'm not taking that. \" Monotone in speech. Eye contact consistent and constant. Dismissive.

## 2022-04-04 NOTE — PROGRESS NOTES
BEHAVIORAL HEALTH FOLLOW-UP NOTE     4/4/2022     Patient was seen and examined in person, Chart reviewed   Patient's case discussed with staff/team    Chief Complaint: Thought blocking, psychotic internally stimulated refusing medications    Interim History: Patient seen in his room he continues to fight block he does not answer questions with any relevance. When asked why he is not taking medications he asked to talk to Dr. Hallie Bashir. \"  He states \"the doctor has my pen number. \"  He is clearly psychotic internally stimulated paranoid and suspicious    I later saw patient again with Dr. Walker Varner at the time patient agrees to take the long-acting Perseris injection he had been taking oral Risperdal but recently stopped as of Friday p.m.'s dose and has been decompensating since. Also agrees to take at bedtime Zyprexa which is what patient took when he was in care home and in the past has reported he had good results with this still appears extremely paranoid psychotic internally stimulated    Appetite:   [x] Normal/Unchanged  [] Increased  [] Decreased      Sleep:       [x] Normal/Unchanged  [] Fair       [] Poor              Energy:    [x] Normal/Unchanged  [] Increased  [] Decreased        SI [] Present  [x] Absent    HI  []Present  [x] Absent     Aggression:  [] yes  [x] no    Patient is [x] able  [] unable to CONTRACT FOR SAFETY     PAST MEDICAL/PSYCHIATRIC HISTORY:   Past Medical History:   Diagnosis Date    Smoking        FAMILY/SOCIAL HISTORY:  No family history on file.   Social History     Socioeconomic History    Marital status: Single     Spouse name: Not on file    Number of children: 1    Years of education: 9    Highest education level: Not on file   Occupational History    Not on file   Tobacco Use    Smoking status: Never Smoker    Smokeless tobacco: Never Used    Tobacco comment: non smoker   Vaping Use    Vaping Use: Never used   Substance and Sexual Activity    Alcohol use: Not Currently  Drug use: Yes     Types: Marijuana Elfida Roldan)    Sexual activity: Never     Partners: Female   Other Topics Concern    Not on file   Social History Narrative    Not on file     Social Determinants of Health     Financial Resource Strain:     Difficulty of Paying Living Expenses: Not on file   Food Insecurity:     Worried About Running Out of Food in the Last Year: Not on file    Zach of Food in the Last Year: Not on file   Transportation Needs:     Lack of Transportation (Medical): Not on file    Lack of Transportation (Non-Medical): Not on file   Physical Activity:     Days of Exercise per Week: Not on file    Minutes of Exercise per Session: Not on file   Stress:     Feeling of Stress : Not on file   Social Connections:     Frequency of Communication with Friends and Family: Not on file    Frequency of Social Gatherings with Friends and Family: Not on file    Attends [employer] Services: Not on file    Active Member of [address] [place] [address] or Organizations: Not on file    Attends Club or Organization Meetings: Not on file    Marital Status: Not on file   Intimate Partner Violence:     Fear of Current or Ex-Partner: Not on file    Emotionally Abused: Not on file    Physically Abused: Not on file    Sexually Abused: Not on file   Housing Stability:     Unable to Pay for Housing in the Last Year: Not on file    Number of [place] in the Last Year: Not on file    Unstable Housing in the Last Year: Not on file           ROS:  [x] All negative/unchanged except if checked.  Explain positive(checked items) below:  [] Constitutional  [] Eyes  [] Ear/Nose/Mouth/Throat  [] Respiratory  [] CV  [] GI  []   [] Musculoskeletal  [] Skin/Breast  [] Neurological  [] Endocrine  [] Heme/Lymph  [] Allergic/Immunologic    Explanation:     MEDICATIONS:    Current Facility-Administered Medications:     risperiDONE (RISPERDAL M-TABS) disintegrating tablet 2 mg, 2 mg, Oral, Daily, Nicky Black, APRN - CNP    risperiDONE (RISPERDAL M-TABS) disintegrating tablet 3 mg, 3 mg, Oral, Nightly, Linda Crowley, APRN - CNP    divalproex (DEPAKOTE) DR tablet 500 mg, 500 mg, Oral, 2 times per day, Carmela Sherman, ASHLYN - CNP, 809 mg at 04/02/22 2154    acetaminophen (TYLENOL) tablet 650 mg, 650 mg, Oral, Q6H PRN, Sheryl Marroquin MD    magnesium hydroxide (MILK OF MAGNESIA) 400 MG/5ML suspension 30 mL, 30 mL, Oral, Daily PRN, Sheryl Marroquin MD    aluminum & magnesium hydroxide-simethicone (MAALOX) 200-200-20 MG/5ML suspension 30 mL, 30 mL, Oral, PRN, Sheryl Marroquin MD    haloperidol (HALDOL) tablet 5 mg, 5 mg, Oral, Q6H PRN **OR** haloperidol lactate (HALDOL) injection 5 mg, 5 mg, IntraMUSCular, Q6H PRN, Sheryl Marroquin MD    melatonin tablet 3 mg, 3 mg, Oral, Nightly, Sheryl Marroquin MD, 3 mg at 04/02/22 2154    hydrOXYzine (VISTARIL) capsule 50 mg, 50 mg, Oral, TID PRN, Sheryl Marroquin MD      Examination:  /83   Pulse 75   Temp 97.7 °F (36.5 °C)   Resp 15   Ht 5' 8\" (1.727 m)   Wt 210 lb (95.3 kg)   SpO2 100%   BMI 31.93 kg/m²   Gait - steady  Medication side effects(SE): Denies    Mental Status Examination:    Level of consciousness:  within normal limits   Appearance:  fair grooming and fair hygiene  Behavior/Motor:  no abnormalities noted  Attitude toward examiner:  cooperative  Speech:  spontaneous, normal rate and normal volume   Mood: \" I am okay. \"  Affect: Irritable easily agitated   Thought processes: Linear without flight of ideas loose associations  Thought content: Appears internally stimulated and paranoid denies SI/HI intent or plan denies auditory visual hallucinations but appears internally preoccupied  Cognition:  oriented to person, place, and time   Concentration intact  Insight poor   Judgement poor     ASSESSMENT:   Patient symptoms are:  [] Well controlled  [] Improving  [] Worsening  [x] No change      Diagnosis:   Principal Problem:    Schizophrenia, paranoid ([facility] [address]  Resolved Problems: * No resolved hospital problems. *      LABS:    No results for input(s): WBC, HGB, PLT in the last 72 hours. No results for input(s): NA, K, CL, CO2, BUN, CREATININE, GLUCOSE in the last 72 hours. No results for input(s): BILITOT, ALKPHOS, AST, ALT in the last 72 hours. Lab Results   Component Value Date    LABAMPH NOT DETECTED 03/29/2022    BARBSCNU NOT DETECTED 03/29/2022    LABBENZ NOT DETECTED 03/29/2022    LABMETH NOT DETECTED 03/29/2022    OPIATESCREENURINE NOT DETECTED 03/29/2022    PHENCYCLIDINESCREENURINE NOT DETECTED 03/29/2022    ETOH <10 03/29/2022     No results found for: TSH, FREET4  No results found for: LITHIUM  Lab Results   Component Value Date    VALPROATE 3 (L) 03/29/2022       Treatment Plan:  Reviewed current Medications with the patient. Risks, benefits, side effects, drug-to-drug interactions and alternatives to treatment were discussed. Collateral information:   CD evaluation  Encourage patient to attend group and other milieu activities.   Discharge planning discussed with the patient and treatment team.    Perseris 120 mg IM every 30 days  Zydis 5 mg at bedtime  Dual antipsychotic therapy as patient is not stabilizing with one agent will need a long-acting injection but states he has done well with Zyprexa in the past    Continue Depakote 500 mg twice daily for mood stabilization  Patient is currently refusing psychotropic medications  PSYCHOTHERAPY/COUNSELING:  [x] Therapeutic interview  [x] Supportive  [] CBT  [] Ongoing  [] Other    [x] Patient continues to need, on a daily basis, active treatment furnished directly by or requiring the supervision of inpatient psychiatric personnel      Anticipated Length of stay: 3 to 7 days based on stability            Electronically signed by ASHLYN Bowman CNP on 5/5/0772 at 9:07 AM

## 2022-04-04 NOTE — PROGRESS NOTES
Pt refused VS and when staff attempted to engage with pt to assess for orientation he was confrontational and dismissive with nonsensical speech. Pt with even and unlabored breathing, no signs of acute physical distress noted. Will continue to monitor and offer support as needed.

## 2022-04-04 NOTE — BH NOTE
At approximately 16:00, patient stated to this nurse Larry Sawant, when are you gonna give me that shot in my stomach? \" Message sent to pharmacy, given to patient at 16:51.

## 2022-04-04 NOTE — BH NOTE
Patient eagerly and enthusiastically wanted to receive the scheduled Perseris 120 mg injection. Taken in right upper quadrant abdominally.

## 2022-04-05 LAB — METER GLUCOSE: 73 MG/DL (ref 74–99)

## 2022-04-05 PROCEDURE — 1240000000 HC EMOTIONAL WELLNESS R&B

## 2022-04-05 PROCEDURE — 6370000000 HC RX 637 (ALT 250 FOR IP): Performed by: PSYCHIATRY & NEUROLOGY

## 2022-04-05 PROCEDURE — 82962 GLUCOSE BLOOD TEST: CPT

## 2022-04-05 PROCEDURE — 6370000000 HC RX 637 (ALT 250 FOR IP): Performed by: NURSE PRACTITIONER

## 2022-04-05 PROCEDURE — 99232 SBSQ HOSP IP/OBS MODERATE 35: CPT | Performed by: NURSE PRACTITIONER

## 2022-04-05 RX ORDER — LORAZEPAM 0.5 MG/1
0.5 TABLET ORAL 2 TIMES DAILY
Status: DISCONTINUED | OUTPATIENT
Start: 2022-04-05 | End: 2022-04-14 | Stop reason: HOSPADM

## 2022-04-05 NOTE — PLAN OF CARE
Problem: Altered Mood, Psychotic Behavior:  Goal: Able to demonstrate trust by eating, participating in treatment and following staff's direction  Description: Able to demonstrate trust by eating, participating in treatment and following staff's direction  Outcome: Ongoing     Problem: Altered Mood, Psychotic Behavior:  Goal: Able to verbalize decrease in frequency and intensity of hallucinations  Description: Able to verbalize decrease in frequency and intensity of hallucinations  Outcome: Ongoing     Problem: Altered Mood, Psychotic Behavior:  Goal: Ability to achieve adequate nutritional intake will improve  Description: Ability to achieve adequate nutritional intake will improve  Outcome: Ongoing     Problem: Altered Mood, Psychotic Behavior:  Goal: Ability to interact with others will improve  Description: Ability to interact with others will improve  4/4/2022 2027 by Mayda Sawyer RN  Outcome: Ongoing   Pt has been withdrawn to his room this evening. Pt is flat, quiet and disorganized. Pt continues to feer this nurse is coming to kill him. Pt reashured that know one was going to harm him. Pt denies any thoughts of suicide or homicide at this time and no dangerous behavior is noted. Pt denies any visual or auditory hallucinations as well.

## 2022-04-05 NOTE — CARE COORDINATION
Juli contacted  [phone], press 3, then press 5 to speak with Bard Fitzgerald. Bard Fitzgerald reports the plan is still for pt to go back to the [facility]. Bard Fitzgerald stated if pt does not want to go back to the [facility] then he would have to go back to [facility]. Bard Fitzgerald reported if pt goes back to [facility] he will be homeless. He stated if pt does go back to [facility] and he is still feeling uncomfortable or being there is causing his mental health more harm than good then they can look into a new placement for pt. Bard Adkinsjonas has been in contact with the [facility] and they are holding pt bed. He asked when pt will be discharging, advised him there is not a set discharge date at this time. Juli also advised Bard Fitzgerald that pt stopped taking his medications for a few days. Bard Fitzgerald would like to be notified prior to pt discharge. Juli contacted the [facility] [employer] [phone]. No answer, a voicemail was left.

## 2022-04-05 NOTE — PROGRESS NOTES
Patient refusing to answer questions. Keeps eyes closed. When assessing patient he held his eyes tight. Vitals signs taken.

## 2022-04-05 NOTE — PLAN OF CARE
Problem: Falls - Risk of:  Goal: Will remain free from falls  Description: Will remain free from falls  4/5/2022 1939 by Sussy Harvey RN  Outcome: Ongoing     Problem: Falls - Risk of:  Goal: Absence of physical injury  Description: Absence of physical injury  4/5/2022 1939 by Sussy Harvey RN  Outcome: Ongoing     Problem: Altered Mood, Psychotic Behavior:  Goal: Able to verbalize decrease in frequency and intensity of hallucinations  Description: Able to verbalize decrease in frequency and intensity of hallucinations  4/5/2022 1939 by Sussy Harvey RN  Outcome: Ongoing     Problem: Altered Mood, Psychotic Behavior:  Goal: Able to verbalize reality based thinking  Description: Able to verbalize reality based thinking  4/5/2022 1939 by Sussy Harvey RN  Outcome: Ongoing     Pt is withdrawn to his room at this time. Pt is lying on his bed motionless and refusing to answer or respond. Pt chest rising and falling, no distress noted, respirations are even and unlabored.

## 2022-04-05 NOTE — PLAN OF CARE
Problem: Falls - Risk of:  Goal: Will remain free from falls  Description: Will remain free from falls  Outcome: Ongoing  Goal: Absence of physical injury  Description: Absence of physical injury  Outcome: Ongoing     Problem: Altered Mood, Psychotic Behavior:  Goal: Able to demonstrate trust by eating, participating in treatment and following staff's direction  Description: Able to demonstrate trust by eating, participating in treatment and following staff's direction  Outcome: Ongoing  Goal: Able to verbalize decrease in frequency and intensity of hallucinations  Description: Able to verbalize decrease in frequency and intensity of hallucinations  Outcome: Ongoing  Goal: Able to verbalize reality based thinking  Description: Able to verbalize reality based thinking  Outcome: Ongoing  Goal: Absence of self-harm  Description: Absence of self-harm  Outcome: Ongoing  Goal: Ability to achieve adequate nutritional intake will improve  Description: Ability to achieve adequate nutritional intake will improve  Outcome: Ongoing  Goal: Ability to interact with others will improve  Description: Ability to interact with others will improve  Outcome: Ongoing  Goal: Compliance with prescribed medication regimen will improve  Description: Compliance with prescribed medication regimen will improve  Outcome: Ongoing  Goal: Patient specific goal  Description: Patient specific goal  Outcome: Ongoing

## 2022-04-05 NOTE — PROGRESS NOTES
Patient isolated to his room most of the morning. He denies SI,HI, or any Hallucinations at this time. He takes his meds but has not attended groups. He appears paranoid and evasive. Will continue to monitor.

## 2022-04-05 NOTE — PROGRESS NOTES
BEHAVIORAL HEALTH FOLLOW-UP NOTE     4/5/2022     Patient was seen and examined in person, Chart reviewed   Patient's case discussed with staff/team    Chief Complaint: Thought blocking, psychotic internally stimulated refusing medications    Interim History: Patient sitting in his room completely paranoid just staring straight ahead does not answer questions he seemed to mumble something about \"being lennon. \"  He is isolative paranoid suspicious does not attend groups or socialize with peers patient seems to be exhibiting catatonic-like features      Appetite:   [x] Normal/Unchanged  [] Increased  [] Decreased      Sleep:       [x] Normal/Unchanged  [] Fair       [] Poor              Energy:    [x] Normal/Unchanged  [] Increased  [] Decreased        SI [] Present  [x] Absent    HI  []Present  [x] Absent     Aggression:  [] yes  [x] no    Patient is [x] able  [] unable to CONTRACT FOR SAFETY     PAST MEDICAL/PSYCHIATRIC HISTORY:   Past Medical History:   Diagnosis Date    Smoking        FAMILY/SOCIAL HISTORY:  No family history on file.   Social History     Socioeconomic History    Marital status: Single     Spouse name: Not on file    Number of children: 1    Years of education: 9    Highest education level: Not on file   Occupational History    Not on file   Tobacco Use    Smoking status: Never Smoker    Smokeless tobacco: Never Used    Tobacco comment: non smoker   Vaping Use    Vaping Use: Never used   Substance and Sexual Activity    Alcohol use: Not Currently    Drug use: Yes     Types: Marijuana Naye Arenas)    Sexual activity: Never     Partners: Female   Other Topics Concern    Not on file   Social History Narrative    Not on file     Social Determinants of Health     Financial Resource Strain:     Difficulty of Paying Living Expenses: Not on file   Food Insecurity:     Worried About Running Out of Food in the Last Year: Not on file    Zach of Food in the Last Year: Not on HLiseth Marie Needs:     Lack of Transportation (Medical): Not on file    Lack of Transportation (Non-Medical): Not on file   Physical Activity:     Days of Exercise per Week: Not on file    Minutes of Exercise per Session: Not on file   Stress:     Feeling of Stress : Not on file   Social Connections:     Frequency of Communication with Friends and Family: Not on file    Frequency of Social Gatherings with Friends and Family: Not on file    Attends [employer] Services: Not on file    Active Member of [address] [place] [address] or Organizations: Not on file    Attends Club or Organization Meetings: Not on file    Marital Status: Not on file   Intimate Partner Violence:     Fear of Current or Ex-Partner: Not on file    Emotionally Abused: Not on file    Physically Abused: Not on file    Sexually Abused: Not on file   Housing Stability:     Unable to Pay for Housing in the Last Year: Not on file    Number of [place] in the Last Year: Not on file    Unstable Housing in the Last Year: Not on file           ROS:  [x] All negative/unchanged except if checked.  Explain positive(checked items) below:  [] Constitutional  [] Eyes  [] Ear/Nose/Mouth/Throat  [] Respiratory  [] CV  [] GI  []   [] Musculoskeletal  [] Skin/Breast  [] Neurological  [] Endocrine  [] Heme/Lymph  [] Allergic/Immunologic    Explanation:     MEDICATIONS:    Current Facility-Administered Medications:     risperiDONE ER (PERSERIS) injection 120 mg, 120 mg, SubCUTAneous, Q30 Days, ASHLYN Chacon - CNP, 417 mg at 04/04/22 1651    OLANZapine zydis (ZYPREXA) disintegrating tablet 5 mg, 5 mg, Oral, Nightly, ASHLYN Chacon CNP, 5 mg at 04/04/22 2143    divalproex (DEPAKOTE) DR tablet 500 mg, 500 mg, Oral, 2 times per day, ASHLYN Carpio - CNP, 249 mg at 04/04/22 2143    acetaminophen (TYLENOL) tablet 650 mg, 650 mg, Oral, Q6H PRN, Kimani Valera MD    magnesium hydroxide (MILK OF MAGNESIA) 400 MG/5ML suspension 30 mL, 30 mL, Oral, Daily PRN, Debora Chacon MD    aluminum & magnesium hydroxide-simethicone (MAALOX) 200-200-20 MG/5ML suspension 30 mL, 30 mL, Oral, PRN, Debora Chacon MD    haloperidol (HALDOL) tablet 5 mg, 5 mg, Oral, Q6H PRN **OR** haloperidol lactate (HALDOL) injection 5 mg, 5 mg, IntraMUSCular, Q6H PRN, Debora Chacon MD    melatonin tablet 3 mg, 3 mg, Oral, Nightly, Debora Chacon MD, 3 mg at 04/04/22 2142    hydrOXYzine (VISTARIL) capsule 50 mg, 50 mg, Oral, TID PRN, Debora Chacon MD      Examination:  /83   Pulse 75   Temp 97.7 °F (36.5 °C)   Resp 15   Ht 5' 8\" (1.727 m)   Wt 210 lb (95.3 kg)   SpO2 100%   BMI 31.93 kg/m²   Gait - steady  Medication side effects(SE): Denies    Mental Status Examination:    Level of consciousness:  within normal limits   Appearance:  fair grooming and fair hygiene  Behavior/Motor:  no abnormalities noted  Attitude toward examiner:  cooperative  Speech:  spontaneous, normal rate and normal volume   Mood: \" I am okay. \"  Affect: Irritable easily agitated   Thought processes: Linear without flight of ideas loose associations  Thought content: Appears internally stimulated and paranoid denies SI/HI intent or plan denies auditory visual hallucinations but appears internally preoccupied  Cognition:  oriented to person, place, and time   Concentration intact  Insight poor   Judgement poor     ASSESSMENT:   Patient symptoms are:  [] Well controlled  [] Improving  [] Worsening  [x] No change      Diagnosis:   Principal Problem:    Schizophrenia, paranoid ([facility]  Resolved Problems:    * No resolved hospital problems. *      LABS:    No results for input(s): WBC, HGB, PLT in the last 72 hours. No results for input(s): NA, K, CL, CO2, BUN, CREATININE, GLUCOSE in the last 72 hours. No results for input(s): BILITOT, ALKPHOS, AST, ALT in the last 72 hours.   Lab Results   Component Value Date    LABAMPH NOT DETECTED 03/29/2022    BARBSCNU NOT DETECTED 03/29/2022    LABBENZ NOT DETECTED 03/29/2022    LABMETH NOT DETECTED 03/29/2022    OPIATESCREENURINE NOT DETECTED 03/29/2022    PHENCYCLIDINESCREENURINE NOT DETECTED 03/29/2022    ETOH <10 03/29/2022     No results found for: TSH, FREET4  No results found for: LITHIUM  Lab Results   Component Value Date    VALPROATE 3 (L) 03/29/2022       Treatment Plan:  Reviewed current Medications with the patient. Risks, benefits, side effects, drug-to-drug interactions and alternatives to treatment were discussed. Collateral information:   CD evaluation  Encourage patient to attend group and other milieu activities.   Discharge planning discussed with the patient and treatment team.    Perseris 120 mg IM every 30 days  Zydis 5 mg at bedtime  Start Ativan 0.5 twice daily for catatonic-like features  Dual antipsychotic therapy as patient is not stabilizing with one agent will need a long-acting injection but states he has done well with Zyprexa in the past  Continue Depakote 500 mg twice daily for mood stabilization        PSYCHOTHERAPY/COUNSELING:  [x] Therapeutic interview  [x] Supportive  [] CBT  [] Ongoing  [] Other    [x] Patient continues to need, on a daily basis, active treatment furnished directly by or requiring the supervision of inpatient psychiatric personnel      Anticipated Length of stay: 3 to 7 days based on stability            Electronically signed by ASHLYN Vera CNP on 3/4/5234 at 8:51 AM

## 2022-04-06 PROCEDURE — 99232 SBSQ HOSP IP/OBS MODERATE 35: CPT | Performed by: NURSE PRACTITIONER

## 2022-04-06 PROCEDURE — 1240000000 HC EMOTIONAL WELLNESS R&B

## 2022-04-06 ASSESSMENT — PAIN SCALES - GENERAL
PAINLEVEL_OUTOF10: 0
PAINLEVEL_OUTOF10: 0

## 2022-04-06 NOTE — BH NOTE
Refused all scheduled morning medications, stating \"I already told you I'm not taking any medicine til I talk to my dad. \" This was the first time the patient mentioned his dad to this nurse.

## 2022-04-06 NOTE — PROGRESS NOTES
BEHAVIORAL HEALTH FOLLOW-UP NOTE     4/6/2022     Patient was seen and examined in person, Chart reviewed   Patient's case discussed with staff/team    Chief Complaint: Thought blocking, psychotic internally stimulated refusing medications    Interim History: Patient seen in his room remains paranoid and psychotic he is disorganized not making any sense states he will not take Depakote because \"a crayon. \"  He is not making any sense he is staying in his room will only eat in his room offers no conversation other than bizarre statements does not answer assessment questions appropriately remain psychotic paranoid internally stimulated    Appetite:   [x] Normal/Unchanged  [] Increased  [] Decreased      Sleep:       [x] Normal/Unchanged  [] Fair       [] Poor              Energy:    [x] Normal/Unchanged  [] Increased  [] Decreased        SI [] Present  [x] Absent    HI  []Present  [x] Absent     Aggression:  [] yes  [x] no    Patient is [x] able  [] unable to CONTRACT FOR SAFETY     PAST MEDICAL/PSYCHIATRIC HISTORY:   Past Medical History:   Diagnosis Date    Smoking        FAMILY/SOCIAL HISTORY:  No family history on file.   Social History     Socioeconomic History    Marital status: Single     Spouse name: Not on file    Number of children: 1    Years of education: 9    Highest education level: Not on file   Occupational History    Not on file   Tobacco Use    Smoking status: Never Smoker    Smokeless tobacco: Never Used    Tobacco comment: non smoker   Vaping Use    Vaping Use: Never used   Substance and Sexual Activity    Alcohol use: Not Currently    Drug use: Yes     Types: Marijuana Belem Muñoz)    Sexual activity: Never     Partners: Female   Other Topics Concern    Not on file   Social History Narrative    Not on file     Social Determinants of Health     Financial Resource Strain:     Difficulty of Paying Living Expenses: Not on file   Food Insecurity:     Worried About Running Out of Food in the Last Year: Not on file    Ran Out of Food in the Last Year: Not on file   Transportation Needs:     Lack of Transportation (Medical): Not on file    Lack of Transportation (Non-Medical): Not on file   Physical Activity:     Days of Exercise per Week: Not on file    Minutes of Exercise per Session: Not on file   Stress:     Feeling of Stress : Not on file   Social Connections:     Frequency of Communication with Friends and Family: Not on file    Frequency of Social Gatherings with Friends and Family: Not on file    Attends Yazidism Services: Not on file    Active Member of [address] [place] [address] [employer] or Organizations: Not on file    Attends Club or Organization Meetings: Not on file    Marital Status: Not on file   Intimate Partner Violence:     Fear of Current or Ex-Partner: Not on file    Emotionally Abused: Not on file    Physically Abused: Not on file    Sexually Abused: Not on file   Housing Stability:     Unable to Pay for Housing in the Last Year: Not on file    Number of [place] in the Last Year: Not on file    Unstable Housing in the Last Year: Not on file           ROS:  [x] All negative/unchanged except if checked.  Explain positive(checked items) below:  [] Constitutional  [] Eyes  [] Ear/Nose/Mouth/Throat  [] Respiratory  [] CV  [] GI  []   [] Musculoskeletal  [] Skin/Breast  [] Neurological  [] Endocrine  [] Heme/Lymph  [] Allergic/Immunologic    Explanation:     MEDICATIONS:    Current Facility-Administered Medications:     LORazepam (ATIVAN) tablet 0.5 mg, 0.5 mg, Oral, BID, Lindaalcira Crowley APRN - CNP    risperiDONE ER (PERSERIS) injection 120 mg, 120 mg, SubCUTAneous, Q30 Days, Linda GAVINO Crowley APRN - CNP, 358 mg at 04/04/22 1651    OLANZapine zydis (ZYPREXA) disintegrating tablet 5 mg, 5 mg, Oral, Nightly, Linda GAVINO Crowley APRN - CNP, 5 mg at 04/04/22 2143    divalproex (DEPAKOTE) DR tablet 500 mg, 500 mg, Oral, 2 times per day, Noel Shay APRN - CNP, 674 mg at 04/04/22 2143   acetaminophen (TYLENOL) tablet 650 mg, 650 mg, Oral, Q6H PRN, Kimani Valera MD    magnesium hydroxide (MILK OF MAGNESIA) 400 MG/5ML suspension 30 mL, 30 mL, Oral, Daily PRN, Kimani Valera MD    aluminum & magnesium hydroxide-simethicone (MAALOX) 200-200-20 MG/5ML suspension 30 mL, 30 mL, Oral, PRN, Kimani Valera MD    haloperidol (HALDOL) tablet 5 mg, 5 mg, Oral, Q6H PRN **OR** haloperidol lactate (HALDOL) injection 5 mg, 5 mg, IntraMUSCular, Q6H PRN, Kimani Valera MD    melatonin tablet 3 mg, 3 mg, Oral, Nightly, Kimani Valera MD, 3 mg at 04/04/22 2142    hydrOXYzine (VISTARIL) capsule 50 mg, 50 mg, Oral, TID PRN, Kimani Valera MD      Examination:  BP (!) 136/97   Pulse 102   Temp 98.4 °F (36.9 °C)   Resp 16   Ht 5' 8\" (1.727 m)   Wt 210 lb (95.3 kg)   SpO2 100%   BMI 31.93 kg/m²   Gait - steady  Medication side effects(SE): Denies    Mental Status Examination:    Level of consciousness:  within normal limits   Appearance:  fair grooming and fair hygiene  Behavior/Motor:  no abnormalities noted  Attitude toward examiner:  cooperative  Speech:  spontaneous, normal rate and normal volume   Mood: \" I am okay. \"  Affect: Irritable easily agitated   Thought processes: Linear without flight of ideas loose associations  Thought content: Appears internally stimulated and paranoid denies SI/HI intent or plan denies auditory visual hallucinations but appears internally preoccupied  Cognition:  oriented to person, place, and time   Concentration intact  Insight poor   Judgement poor     ASSESSMENT:   Patient symptoms are:  [] Well controlled  [] Improving  [] Worsening  [x] No change      Diagnosis:   Principal Problem:    Sch[facility]ia, [facility] ([facility]  Resolved Problems:    * No resolved hospital problems. *      LABS:    No results for input(s): WBC, HGB, PLT in the last 72 hours. No results for input(s): NA, K, CL, CO2, BUN, CREATININE, GLUCOSE in the last 72 hours.   No results for input(s): BILITOT, ALKPHOS, AST, ALT in the last 72 hours. Lab Results   Component Value Date    LABAMPH NOT DETECTED 03/29/2022    BARBSCNU NOT DETECTED 03/29/2022    LABBENZ NOT DETECTED 03/29/2022    LABMETH NOT DETECTED 03/29/2022    OPIATESCREENURINE NOT DETECTED 03/29/2022    PHENCYCLIDINESCREENURINE NOT DETECTED 03/29/2022    ETOH <10 03/29/2022     No results found for: TSH, FREET4  No results found for: LITHIUM  Lab Results   Component Value Date    VALPROATE 3 (L) 03/29/2022       Treatment Plan:  Reviewed current Medications with the patient. Risks, benefits, side effects, drug-to-drug interactions and alternatives to treatment were discussed. Collateral information:   CD evaluation  Encourage patient to attend group and other milieu activities.   Discharge planning discussed with the patient and treatment team.    Perseris 120 mg IM every 30 days  Zydis 5 mg at bedtime  Continue Ativan 0.5 twice daily for catatonic-like features  Dual antipsychotic therapy as patient is not stabilizing with one agent will need a long-acting injection but states he has done well with Zyprexa in the past  Continue Depakote 500 mg twice daily for mood stabilization        PSYCHOTHERAPY/COUNSELING:  [x] Therapeutic interview  [x] Supportive  [] CBT  [] Ongoing  [] Other    [x] Patient continues to need, on a daily basis, active treatment furnished directly by or requiring the supervision of inpatient psychiatric personnel      Anticipated Length of stay: 3 to 7 days based on stability            Electronically signed by ASHLYN Quesada CNP on 3/7/8969 at 4:33 PM

## 2022-04-06 NOTE — PROGRESS NOTES
Patient refusing to cooperate on assessment. States he is \"[employer] not Senior. \" States \" I want my brother Nathan Ask to get me. \" He is isolated to his room, doesn't eat, States\"I am wanting to loose weight\". Non social to peers. Refuses meds and groups. Will continue to monitor.

## 2022-04-06 NOTE — CARE COORDINATION
Juli spoke with Sima Agustin at the [facility]. Sima Agustin confirmed that pt is able to return at time of discharge. Sima Agustin would prefer for pt to have meds in hand so they can monitor the meds. Juli advised Sima Agustin that there is not a set discharge date at this time, advised him that once pt begins to stabilize and a discharge date is disclosed Sw will inform him. Terry expressed no concerns with pt returning as long as he is stable.

## 2022-04-06 NOTE — PROGRESS NOTES
[facility] Interdisciplinary Treatment Plan Note     Review Date & Time: 4/6/22 0900    Patient was not in treatment team.    Estimated Length of Stay Update:  3-5 days   Estimated Discharge Date Update: 3-5 days    EDUCATION:   Learner Progress Toward Treatment Goals: Reviewed results and recommendations of this team    Method: Individual    Outcome: Verbalized understanding    PATIENT GOALS:  No shared goals    PLAN/TREATMENT RECOMMENDATIONS UPDATE: continue current treatment and monitor.     GOALS UPDATE:  Time frame for Short-Term Goals: 3-5 days      Supriya Bashir RN

## 2022-04-07 PROCEDURE — 99232 SBSQ HOSP IP/OBS MODERATE 35: CPT | Performed by: NURSE PRACTITIONER

## 2022-04-07 PROCEDURE — 6370000000 HC RX 637 (ALT 250 FOR IP): Performed by: NURSE PRACTITIONER

## 2022-04-07 PROCEDURE — 1240000000 HC EMOTIONAL WELLNESS R&B

## 2022-04-07 PROCEDURE — 6370000000 HC RX 637 (ALT 250 FOR IP): Performed by: PSYCHIATRY & NEUROLOGY

## 2022-04-07 RX ADMIN — OLANZAPINE 5 MG: 5 TABLET, ORALLY DISINTEGRATING ORAL at 20:33

## 2022-04-07 RX ADMIN — Medication 3 MG: at 20:33

## 2022-04-07 RX ADMIN — DIVALPROEX SODIUM 500 MG: 500 TABLET, DELAYED RELEASE ORAL at 20:33

## 2022-04-07 RX ADMIN — LORAZEPAM 0.5 MG: 0.5 TABLET ORAL at 20:33

## 2022-04-07 ASSESSMENT — PAIN SCALES - GENERAL
PAINLEVEL_OUTOF10: 0

## 2022-04-07 NOTE — BH NOTE
Awake in bed sitting up suspicious and paranoid discussed his non compliance with meds tells me \"I got an injection I don't need pills\" did med teaching seems more agreeable to take HS meds tonight emotional support given

## 2022-04-07 NOTE — PLAN OF CARE
Problem: Altered Mood, Psychotic Behavior:  Goal: Able to demonstrate trust by eating, participating in treatment and following staff's direction  Description: Able to demonstrate trust by eating, participating in treatment and following staff's direction  4/6/2022 2024 by Jocelyne James RN  Outcome: Ongoing     Problem: Altered Mood, Psychotic Behavior:  Goal: Able to verbalize reality based thinking  Description: Able to verbalize reality based thinking  4/6/2022 2024 by Jocelyne James RN  Outcome: Ongoing     Patient has been withdrawn to his room. Did come out for snack. Upon entering patients room, he stated \"I don't want any trouble\". Patient informed that I will be his nurse for the night. He responded \"I don't want you in my room. People will think Im a predator\". Patient remained paranoid during the encounter. Evasive. No reports of suicidal/homicidal ideations and hallucinations at this time. Purposeful rounding continued.

## 2022-04-07 NOTE — PROGRESS NOTES
Patient has been awake in his room. Has made multiple paranoid statements such as \"Keep my door closed so the smoke doesn't get in\" and \"I don't want no trouble. I hear you guys calling me a stalker and I hear you clicking your guns\". Attempted to reorient patient, but he still states \"I don't want no trouble\". Will continue to monitor.

## 2022-04-07 NOTE — CARE COORDINATION
Sw met with pt. Pt observed laying in bed. Pt reports feeling a little depressed today. Sw asked if pt was having any SI and pt stated he is not sure, he just wanted to get his thoughts and feelings together. Pt denied HI/AVH. Sw asked if pt was taking his medications, pt stated he got his injection. Pt stated he is not taking the oral medications because he doesn't want to overdose. Pt stated he also doesn't want to take his meds because he is hearing people accusing him of things, he would not elaborate further. Pt has poor eye contact, delayed responses, poor insight/judgement.

## 2022-04-07 NOTE — PLAN OF CARE
Pt is without immediate distress at this time. Pt does not answer  suicidal / homicidal assessment questions. Pt does not answer hallucination assessment questions. Pt has poverty of content and speech. Appears internally stimulated and paranoid. Will follow and monitor.

## 2022-04-07 NOTE — BH NOTE
Pt is stable without immediate distress. Pt continues to be isolative, expresses poverty of speech / content. Pt has loose associations, delusional / mumbled statements when pt does communicate. Pt does not answer questions when asked about the suicidal  / homicidal ideations, or hallucinations. Pt has a flat / paranoid affect. Will follow and monitor.

## 2022-04-07 NOTE — PROGRESS NOTES
BEHAVIORAL HEALTH FOLLOW-UP NOTE     4/7/2022     Patient was seen and examined in person, Chart reviewed   Patient's case discussed with staff/team    Chief Complaint: Thought blocking, psychotic internally stimulated refusing medications    Interim History: Patient seen in his room remains paranoid and psychotic he is disorganized not making any sense states he wants to be discharged to his \"real family. \"  Staff reports he has been making multiple persecutory delusional statements please the people think that he is a predator did not want night nurse in his room. He is evasive isolative does not attend groups or socialize with peers paranoid delusional refusing p.o. medications he did have the Perseris injection      Appetite:   [x] Normal/Unchanged  [] Increased  [] Decreased      Sleep:       [x] Normal/Unchanged  [] Fair       [] Poor              Energy:    [x] Normal/Unchanged  [] Increased  [] Decreased        SI [] Present  [x] Absent    HI  []Present  [x] Absent     Aggression:  [] yes  [x] no    Patient is [x] able  [] unable to CONTRACT FOR SAFETY     PAST MEDICAL/PSYCHIATRIC HISTORY:   Past Medical History:   Diagnosis Date    Smoking        FAMILY/SOCIAL HISTORY:  No family history on file.   Social History     Socioeconomic History    Marital status: Single     Spouse name: Not on file    Number of children: 1    Years of education: 9    Highest education level: Not on file   Occupational History    Not on file   Tobacco Use    Smoking status: Never Smoker    Smokeless tobacco: Never Used    Tobacco comment: non smoker   Vaping Use    Vaping Use: Never used   Substance and Sexual Activity    Alcohol use: Not Currently    Drug use: Yes     Types: Marijuana Rodena Capes)    Sexual activity: Never     Partners: Female   Other Topics Concern    Not on file   Social History Narrative    Not on file     Social Determinants of Health     Financial Resource Strain:     Difficulty of Paying Living Expenses: Not on file   Food Insecurity:     Worried About Running Out of Food in the Last Year: Not on file    Zach of Food in the Last Year: Not on file   Transportation Needs:     Lack of Transportation (Medical): Not on file    Lack of Transportation (Non-Medical): Not on file   Physical Activity:     Days of Exercise per Week: Not on file    Minutes of Exercise per Session: Not on file   Stress:     Feeling of Stress : Not on file   Social Connections:     Frequency of Communication with Friends and Family: Not on file    Frequency of Social Gatherings with Friends and Family: Not on file    Attends Alevism Services: Not on file    Active Member of [address] [place] [address] [employer] or Organizations: Not on file    Attends Club or Organization Meetings: Not on file    Marital Status: Not on file   Intimate Partner Violence:     Fear of Current or Ex-Partner: Not on file    Emotionally Abused: Not on file    Physically Abused: Not on file    Sexually Abused: Not on file   Housing Stability:     Unable to Pay for Housing in the Last Year: Not on file    Number of [place] in the Last Year: Not on file    Unstable Housing in the Last Year: Not on file           ROS:  [x] All negative/unchanged except if checked.  Explain positive(checked items) below:  [] Constitutional  [] Eyes  [] Ear/Nose/Mouth/Throat  [] Respiratory  [] CV  [] GI  []   [] Musculoskeletal  [] Skin/Breast  [] Neurological  [] Endocrine  [] Heme/Lymph  [] Allergic/Immunologic    Explanation:     MEDICATIONS:    Current Facility-Administered Medications:     LORazepam (ATIVAN) tablet 0.5 mg, 0.5 mg, Oral, BID, LindaASHLYN Ballard CNP    risperiDONE ER (PERSERIS) injection 120 mg, 120 mg, SubCUTAneous, Q30 Days, Linda ASHLYN Rodriguez - CNP, 857 mg at 04/04/22 1651    OLANZapine zydis (ZYPREXA) disintegrating tablet 5 mg, 5 mg, Oral, Nightly, ASHLYN Mcintosh CNP, 5 mg at 04/04/22 2143    divalproex (DEPAKOTE) DR tablet 500 mg, 500 mg, Oral, 2 times per day, Radha Orr, APRN - CNP, 672 mg at 04/04/22 2143    acetaminophen (TYLENOL) tablet 650 mg, 650 mg, Oral, Q6H PRN, Scar Valladares MD    magnesium hydroxide (MILK OF MAGNESIA) 400 MG/5ML suspension 30 mL, 30 mL, Oral, Daily PRN, Scar Valladares MD    aluminum & magnesium hydroxide-simethicone (MAALOX) 200-200-20 MG/5ML suspension 30 mL, 30 mL, Oral, PRN, Scar Valladares MD    haloperidol (HALDOL) tablet 5 mg, 5 mg, Oral, Q6H PRN **OR** haloperidol lactate (HALDOL) injection 5 mg, 5 mg, IntraMUSCular, Q6H PRN, Scar Valladares MD    melatonin tablet 3 mg, 3 mg, Oral, Nightly, Scar Valladares MD, 3 mg at 04/04/22 2142    hydrOXYzine (VISTARIL) capsule 50 mg, 50 mg, Oral, TID PRN, Scar Valladares MD      Examination:  BP (!) 136/97   Pulse 102   Temp 98.4 °F (36.9 °C)   Resp 16   Ht 5' 8\" (1.727 m)   Wt 210 lb (95.3 kg)   SpO2 100%   BMI 31.93 kg/m²   Gait - steady  Medication side effects(SE): Denies    Mental Status Examination:    Level of consciousness:  within normal limits   Appearance:  fair grooming and fair hygiene  Behavior/Motor:  no abnormalities noted  Attitude toward examiner:  cooperative  Speech:  spontaneous, normal rate and normal volume   Mood: \" I am okay. \"  Affect: Irritable easily agitated   Thought processes: Linear without flight of ideas loose associations  Thought content: Appears internally stimulated and paranoid denies SI/HI intent or plan denies auditory visual hallucinations but appears internally preoccupied  Cognition:  oriented to person, place, and time   Concentration intact  Insight poor   Judgement poor     ASSESSMENT:   Patient symptoms are:  [] Well controlled  [] Improving  [] Worsening  [x] No change      Diagnosis:   Principal Problem:    Schizophrenia, paranoid ([facility]  Resolved Problems:    * No resolved hospital problems. *      LABS:    No results for input(s): WBC, HGB, PLT in the last 72 hours.   No results for input(s): NA, K, CL, CO2, BUN, CREATININE, GLUCOSE in the last 72 hours. No results for input(s): BILITOT, ALKPHOS, AST, ALT in the last 72 hours. Lab Results   Component Value Date    LABAMPH NOT DETECTED 03/29/2022    BARBSCNU NOT DETECTED 03/29/2022    LABBENZ NOT DETECTED 03/29/2022    LABMETH NOT DETECTED 03/29/2022    OPIATESCREENURINE NOT DETECTED 03/29/2022    PHENCYCLIDINESCREENURINE NOT DETECTED 03/29/2022    ETOH <10 03/29/2022     No results found for: TSH, FREET4  No results found for: LITHIUM  Lab Results   Component Value Date    VALPROATE 3 (L) 03/29/2022       Treatment Plan:  Reviewed current Medications with the patient. Risks, benefits, side effects, drug-to-drug interactions and alternatives to treatment were discussed. Collateral information:   CD evaluation  Encourage patient to attend group and other milieu activities. Discharge planning discussed with the patient and treatment team.    Perseris 120 mg IM every 30 days  Zydis 5 mg at bedtime  Continue Ativan 0.5 twice daily for catatonic-like features  Dual antipsychotic therapy as patient is not stabilizing with one agent will need a long-acting injection but states he has done well with Zyprexa in the past  Continue Depakote 500 mg twice daily for mood stabilization        PSYCHOTHERAPY/COUNSELING:  [x] Therapeutic interview  [x] Supportive  [] CBT  [] Ongoing  [] Other    [x] Patient continues to need, on a daily basis, active treatment furnished directly by or requiring the supervision of inpatient psychiatric personnel                  Behavioral Services                                              Medicare Re-Certification    I certify that the inpatient psychiatric hospital services furnished since the previous certification/re-certification were, and continue to be, medically necessary for;    [x] (1) Treatment which could reasonably be expected to improve the patient's condition,    [] (2) Or for diagnostic study.     Estimated length of stay/service 3-17 days based on stability    Plan for post-hospital care outpatient psychiatric and counseling services    This patient continues to need, on a daily basis, active treatment furnished directly by or requiring the supervision of inpatient psychiatric personnel.     Electronically signed by ASHLYN Norman CNP on 5/7/6796 at 8:56 AM   Electronically signed by ASHLYN Norman CNP on 5/2/4449 at 8:55 AM              Electronically signed by ASHLYN Norman CNP on 1/5/8282 at 8:55 AM

## 2022-04-08 PROCEDURE — 99232 SBSQ HOSP IP/OBS MODERATE 35: CPT | Performed by: NURSE PRACTITIONER

## 2022-04-08 PROCEDURE — 1240000000 HC EMOTIONAL WELLNESS R&B

## 2022-04-08 ASSESSMENT — PAIN SCALES - GENERAL: PAINLEVEL_OUTOF10: 0

## 2022-04-08 NOTE — PLAN OF CARE
Pt is without immediate distress. Pt continues to be isolative, stares, very few if any words spoken when pt asked questions. Pt does not communicate with this RN when asked questions. Pt is without observable distress. Will follow and monitor.

## 2022-04-08 NOTE — BH NOTE
This RN attempted afternoon assessment and pt is without distress. Pt is thought blocking and this RN is unable to have pt answer questions regarding status / SI / HI /Hallucinations. No other information was able to be obtained. Pt has a stare into distance, poor eye contact, appears flat, paranoid. Will follow and monitor.

## 2022-04-08 NOTE — CARE COORDINATION
Sw met with pt. Sw asked pt how he was doing, pt had a delayed response and then stated he Gabe Livers been drinking any alcohol and not doing any drugs. \" pt then made a statement about how he is not a girl. Pt responses are delayed, he makes no eye contact, continues to appear delusional and disorganized.

## 2022-04-08 NOTE — PROGRESS NOTES
BEHAVIORAL HEALTH FOLLOW-UP NOTE     4/8/2022     Patient was seen and examined in person, Chart reviewed   Patient's case discussed with staff/team    Chief Complaint: Disorganized    Interim History: Sitting in his room alone completely paranoid per staff makes statements that do not make any sense when asked how he is doing to make statements about \"not being a girl. \"  When I went to assess the patient he made no eye contact he just stared straight ahead did not say anything. He has been refusing all medications however did take some doses last night. He has no insight or judgment acutely psychotic paranoid internally stimulated    Appetite:   [x] Normal/Unchanged  [] Increased  [] Decreased      Sleep:       [x] Normal/Unchanged  [] Fair       [] Poor              Energy:    [x] Normal/Unchanged  [] Increased  [] Decreased        SI [] Present  [x] Absent    HI  []Present  [x] Absent     Aggression:  [] yes  [x] no    Patient is [x] able  [] unable to CONTRACT FOR SAFETY     PAST MEDICAL/PSYCHIATRIC HISTORY:   Past Medical History:   Diagnosis Date    Smoking        FAMILY/SOCIAL HISTORY:  No family history on file.   Social History     Socioeconomic History    Marital status: Single     Spouse name: Not on file    Number of children: 1    Years of education: 9    Highest education level: Not on file   Occupational History    Not on file   Tobacco Use    Smoking status: Never Smoker    Smokeless tobacco: Never Used    Tobacco comment: non smoker   Vaping Use    Vaping Use: Never used   Substance and Sexual Activity    Alcohol use: Not Currently    Drug use: Yes     Types: Marijuana Rodena Capes)    Sexual activity: Never     Partners: Female   Other Topics Concern    Not on file   Social History Narrative    Not on file     Social Determinants of Health     Financial Resource Strain:     Difficulty of Paying Living Expenses: Not on file   Food Insecurity:     Worried About Running Out of Food in the Last Year: Not on file    Ran Out of Food in the Last Year: Not on file   Transportation Needs:     Lack of Transportation (Medical): Not on file    Lack of Transportation (Non-Medical): Not on file   Physical Activity:     Days of Exercise per Week: Not on file    Minutes of Exercise per Session: Not on file   Stress:     Feeling of Stress : Not on file   Social Connections:     Frequency of Communication with Friends and Family: Not on file    Frequency of Social Gatherings with Friends and Family: Not on file    Attends Worship Services: Not on file    Active Member of [address] [place] [address] [employer] or Organizations: Not on file    Attends Club or Organization Meetings: Not on file    Marital Status: Not on file   Intimate Partner Violence:     Fear of Current or Ex-Partner: Not on file    Emotionally Abused: Not on file    Physically Abused: Not on file    Sexually Abused: Not on file   Housing Stability:     Unable to Pay for Housing in the Last Year: Not on file    Number of [place] in the Last Year: Not on file    Unstable Housing in the Last Year: Not on file           ROS:  [x] All negative/unchanged except if checked.  Explain positive(checked items) below:  [] Constitutional  [] Eyes  [] Ear/Nose/Mouth/Throat  [] Respiratory  [] CV  [] GI  []   [] Musculoskeletal  [] Skin/Breast  [] Neurological  [] Endocrine  [] Heme/Lymph  [] Allergic/Immunologic    Explanation:     MEDICATIONS:    Current Facility-Administered Medications:     LORazepam (ATIVAN) tablet 0.5 mg, 0.5 mg, Oral, BID, Linda B Keo, APRN - CNP, 0.5 mg at 04/07/22 2033    risperiDONE ER (PERSERIS) injection 120 mg, 120 mg, SubCUTAneous, Q30 Days, Linda B Keo, APRN - CNP, 674 mg at 04/04/22 1651    OLANZapine zydis (ZYPREXA) disintegrating tablet 5 mg, 5 mg, Oral, Nightly, Linda B Keo, APRN - CNP, 5 mg at 04/07/22 2033    divalproex (DEPAKOTE) DR tablet 500 mg, 500 mg, Oral, 2 times per day, Magi Gomez APRN - CNP, 031 mg at 04/07/22 2033    acetaminophen (TYLENOL) tablet 650 mg, 650 mg, Oral, Q6H PRN, Maribel Bhatia MD    magnesium hydroxide (MILK OF MAGNESIA) 400 MG/5ML suspension 30 mL, 30 mL, Oral, Daily PRN, Maribel Bhatia MD    aluminum & magnesium hydroxide-simethicone (MAALOX) 200-200-20 MG/5ML suspension 30 mL, 30 mL, Oral, PRN, Maribel Bhatia MD    haloperidol (HALDOL) tablet 5 mg, 5 mg, Oral, Q6H PRN **OR** haloperidol lactate (HALDOL) injection 5 mg, 5 mg, IntraMUSCular, Q6H PRN, Maribel Bhatia MD    melatonin tablet 3 mg, 3 mg, Oral, Nightly, Maribel Bhatia MD, 3 mg at 04/07/22 2033    hydrOXYzine (VISTARIL) capsule 50 mg, 50 mg, Oral, TID PRN, Maribel Bhatia MD      Examination:  BP (!) 136/97   Pulse 102   Temp 98.4 °F (36.9 °C)   Resp 16   Ht 5' 8\" (1.727 m)   Wt 210 lb (95.3 kg)   SpO2 100%   BMI 31.93 kg/m²   Gait - steady  Medication side effects(SE): Denies    Mental Status Examination:    Level of consciousness:  within normal limits   Appearance:  fair grooming and fair hygiene  Behavior/Motor:  no abnormalities noted  Attitude toward examiner:  cooperative  Speech:  spontaneous, normal rate and normal volume   Mood: \" I am okay. \"  Affect: Irritable easily agitated   Thought processes: Linear without flight of ideas loose associations  Thought content: Appears internally stimulated and paranoid denies SI/HI intent or plan denies auditory visual hallucinations but appears internally preoccupied  Cognition:  oriented to person, place, and time   Concentration intact  Insight poor   Judgement poor     ASSESSMENT:   Patient symptoms are:  [] Well controlled  [] Improving  [] Worsening  [x] No change      Diagnosis:   Principal Problem:    Schizophrenia, paranoid ([facility]  Resolved Problems:    * No resolved hospital problems. *      LABS:    No results for input(s): WBC, HGB, PLT in the last 72 hours.   No results for input(s): NA, K, CL, CO2, BUN, CREATININE, GLUCOSE in the last 72 hours. No results for input(s): BILITOT, ALKPHOS, AST, ALT in the last 72 hours. Lab Results   Component Value Date    LABAMPH NOT DETECTED 03/29/2022    BARBSCNU NOT DETECTED 03/29/2022    LABBENZ NOT DETECTED 03/29/2022    LABMETH NOT DETECTED 03/29/2022    OPIATESCREENURINE NOT DETECTED 03/29/2022    PHENCYCLIDINESCREENURINE NOT DETECTED 03/29/2022    ETOH <10 03/29/2022     No results found for: TSH, FREET4  No results found for: LITHIUM  Lab Results   Component Value Date    VALPROATE 3 (L) 03/29/2022       Treatment Plan:  Reviewed current Medications with the patient. Risks, benefits, side effects, drug-to-drug interactions and alternatives to treatment were discussed. Collateral information:   CD evaluation  Encourage patient to attend group and other milieu activities. Discharge planning discussed with the patient and treatment team.    Perseris 120 mg IM every 30 days  Zydis 5 mg at bedtime  Continue Ativan 0.5 twice daily for catatonic-like features  Dual antipsychotic therapy as patient is not stabilizing with one agent will need a long-acting injection but states he has done well with Zyprexa in the past  Continue Depakote 500 mg twice daily for mood stabilization        PSYCHOTHERAPY/COUNSELING:  [x] Therapeutic interview  [x] Supportive  [] CBT  [] Ongoing  [] Other    [x] Patient continues to need, on a daily basis, active treatment furnished directly by or requiring the supervision of inpatient psychiatric personnel                  Behavioral Services                                              Medicare Re-Certification    I certify that the inpatient psychiatric hospital services furnished since the previous certification/re-certification were, and continue to be, medically necessary for;    [x] (1) Treatment which could reasonably be expected to improve the patient's condition,    [] (2) Or for diagnostic study.     Estimated length of stay/service 3-17 days based on stability    Plan for

## 2022-04-09 PROCEDURE — 99231 SBSQ HOSP IP/OBS SF/LOW 25: CPT | Performed by: NURSE PRACTITIONER

## 2022-04-09 PROCEDURE — 6370000000 HC RX 637 (ALT 250 FOR IP): Performed by: NURSE PRACTITIONER

## 2022-04-09 PROCEDURE — 1240000000 HC EMOTIONAL WELLNESS R&B

## 2022-04-09 RX ADMIN — DIVALPROEX SODIUM 500 MG: 500 TABLET, DELAYED RELEASE ORAL at 08:44

## 2022-04-09 RX ADMIN — DIVALPROEX SODIUM 500 MG: 500 TABLET, DELAYED RELEASE ORAL at 21:00

## 2022-04-09 ASSESSMENT — PAIN SCALES - GENERAL
PAINLEVEL_OUTOF10: 0
PAINLEVEL_OUTOF10: 0

## 2022-04-09 NOTE — PROGRESS NOTES
BEHAVIORAL HEALTH FOLLOW-UP NOTE     4/9/2022     Patient was seen and examined in person, Chart reviewed   Patient's case discussed with staff/team    Chief Complaint: Disorganized,     Interim History: Sitting in his room alone completely paranoid. Patient is thought blocking, very delayed response.,  He does make brief eye contact and appears completely psychotic. Becoming selective with his medications and did take his dose of Depakote this morning for the nurse. He has no insight or judgment acutely psychotic paranoid internally stimulated    Appetite:   [x] Normal/Unchanged  [] Increased  [] Decreased      Sleep:       [x] Normal/Unchanged  [] Fair       [] Poor              Energy:    [x] Normal/Unchanged  [] Increased  [] Decreased        SI [] Present  [x] Absent    HI  []Present  [x] Absent     Aggression:  [] yes  [x] no    Patient is [x] able  [] unable to CONTRACT FOR SAFETY     PAST MEDICAL/PSYCHIATRIC HISTORY:   Past Medical History:   Diagnosis Date    Smoking        FAMILY/SOCIAL HISTORY:  No family history on file.   Social History     Socioeconomic History    Marital status: Single     Spouse name: Not on file    Number of children: 1    Years of education: 9    Highest education level: Not on file   Occupational History    Not on file   Tobacco Use    Smoking status: Never Smoker    Smokeless tobacco: Never Used    Tobacco comment: non smoker   Vaping Use    Vaping Use: Never used   Substance and Sexual Activity    Alcohol use: Not Currently    Drug use: Yes     Types: Marijuana Naye Arenas)    Sexual activity: Never     Partners: Female   Other Topics Concern    Not on file   Social History Narrative    Not on file     Social Determinants of Health     Financial Resource Strain:     Difficulty of Paying Living Expenses: Not on file   Food Insecurity:     Worried About Running Out of Food in the Last Year: Not on file    Zach of Food in the Last Year: Not on HLiseth Marie MD Roxanne    magnesium hydroxide (MILK OF MAGNESIA) 400 MG/5ML suspension 30 mL, 30 mL, Oral, Daily PRN, Merlin Dallas MD    aluminum & magnesium hydroxide-simethicone (MAALOX) 200-200-20 MG/5ML suspension 30 mL, 30 mL, Oral, PRN, Merlin Dallas MD    haloperidol (HALDOL) tablet 5 mg, 5 mg, Oral, Q6H PRN **OR** haloperidol lactate (HALDOL) injection 5 mg, 5 mg, IntraMUSCular, Q6H PRN, Merlin Dallas MD    melatonin tablet 3 mg, 3 mg, Oral, Nightly, Merlin Dallas MD, 3 mg at 04/07/22 2033    hydrOXYzine (VISTARIL) capsule 50 mg, 50 mg, Oral, TID PRN, Merlin Dallas MD      Examination:  BP (!) 136/97   Pulse 102   Temp 98.4 °F (36.9 °C)   Resp 16   Ht 5' 8\" (1.727 m)   Wt 210 lb (95.3 kg)   SpO2 100%   BMI 31.93 kg/m²   Gait - steady  Medication side effects(SE): Denies    Mental Status Examination:    Level of consciousness:  within normal limits   Appearance:  fair grooming and fair hygiene  Behavior/Motor:  no abnormalities noted  Attitude toward examiner:  cooperative  Speech:  spontaneous, normal rate and normal volume   Mood: \" I am okay. \"  Affect: Irritable easily agitated   Thought processes: Linear without flight of ideas loose associations  Thought content: Appears internally stimulated and paranoid denies SI/HI intent or plan denies auditory visual hallucinations but appears internally preoccupied  Cognition:  oriented to person, place, and time   Concentration intact  Insight poor   Judgement poor     ASSESSMENT:   Patient symptoms are:  [] Well controlled  [] Improving  [] Worsening  [x] No change      Diagnosis:   Principal Problem:    Schizophrenia, paranoid ([facility]  Resolved Problems:    * No resolved hospital problems. *      LABS:    No results for input(s): WBC, HGB, PLT in the last 72 hours. No results for input(s): NA, K, CL, CO2, BUN, CREATININE, GLUCOSE in the last 72 hours. No results for input(s): BILITOT, ALKPHOS, AST, ALT in the last 72 hours.   Lab Results Component Value Date    LABAMPH NOT DETECTED 03/29/2022    BARBSCNU NOT DETECTED 03/29/2022    LABBENZ NOT DETECTED 03/29/2022    LABMETH NOT DETECTED 03/29/2022    OPIATESCREENURINE NOT DETECTED 03/29/2022    PHENCYCLIDINESCREENURINE NOT DETECTED 03/29/2022    ETOH <10 03/29/2022     No results found for: TSH, FREET4  No results found for: LITHIUM  Lab Results   Component Value Date    VALPROATE 3 (L) 03/29/2022       Treatment Plan:  Reviewed current Medications with the patient. Risks, benefits, side effects, drug-to-drug interactions and alternatives to treatment were discussed. Collateral information:   CD evaluation  Encourage patient to attend group and other milieu activities. Discharge planning discussed with the patient and treatment team.    Perseris 120 mg IM every 30 days  Zydis 5 mg at bedtime  Continue Ativan 0.5 twice daily for catatonic-like features  Dual antipsychotic therapy as patient is not stabilizing with one agent will need a long-acting injection but states he has done well with Zyprexa in the past  Continue Depakote 500 mg twice daily for mood stabilization        PSYCHOTHERAPY/COUNSELING:  [x] Therapeutic interview  [x] Supportive  [] CBT  [] Ongoing  [] Other    [x] Patient continues to need, on a daily basis, active treatment furnished directly by or requiring the supervision of inpatient psychiatric personnel                  Behavioral Services                                              Medicare Re-Certification    I certify that the inpatient psychiatric hospital services furnished since the previous certification/re-certification were, and continue to be, medically necessary for;    [x] (1) Treatment which could reasonably be expected to improve the patient's condition,    [] (2) Or for diagnostic study.     Estimated length of stay/service 3-17 days based on stability    Plan for post-hospital care outpatient psychiatric and counseling services    This patient continues to need, on a daily basis, active treatment furnished directly by or requiring the supervision of inpatient psychiatric personnel.     Electronically signed by ASHLYN Montez CNP on 4/9/2022 at 11:30 AM   Electronically signed by ASHLYN Montez CNP on 4/9/2022 at 11:30 AM              Electronically signed by ASHLYN Montez CNP on 4/9/2022 at 11:30 AM

## 2022-04-09 NOTE — PROGRESS NOTES
Patient denies SI,HI, or any hallucinations at this time. He is isolative in his room. Paranoid with RN touching his menu helping him fill it out, paranoid with meds. He took his Depakote but refused Ativan. Does not attend groups. Will continue to monitor.

## 2022-04-10 PROCEDURE — 99231 SBSQ HOSP IP/OBS SF/LOW 25: CPT | Performed by: NURSE PRACTITIONER

## 2022-04-10 PROCEDURE — 1240000000 HC EMOTIONAL WELLNESS R&B

## 2022-04-10 PROCEDURE — 6370000000 HC RX 637 (ALT 250 FOR IP): Performed by: PSYCHIATRY & NEUROLOGY

## 2022-04-10 PROCEDURE — 6370000000 HC RX 637 (ALT 250 FOR IP): Performed by: NURSE PRACTITIONER

## 2022-04-10 RX ADMIN — LORAZEPAM 0.5 MG: 0.5 TABLET ORAL at 21:25

## 2022-04-10 RX ADMIN — Medication 3 MG: at 21:25

## 2022-04-10 RX ADMIN — DIVALPROEX SODIUM 500 MG: 500 TABLET, DELAYED RELEASE ORAL at 21:25

## 2022-04-10 RX ADMIN — OLANZAPINE 5 MG: 5 TABLET, ORALLY DISINTEGRATING ORAL at 21:25

## 2022-04-10 ASSESSMENT — PAIN SCALES - GENERAL: PAINLEVEL_OUTOF10: 0

## 2022-04-10 NOTE — CARE COORDINATION
SW met with patient. Patient presents as calm, cooperative, with delayed responses. Patient requested the number to speak to Erasto Queen. SW provided the information and explained the instructions to patient several times.  Patient denies SI/HI/AVH

## 2022-04-10 NOTE — BH NOTE
Patient spent majority of the day in his bed. Patient refuses all medications. Patient refuses to go to group. Patient is resistive to any care attempted by nurse. Patient does deny si/hi/avh.

## 2022-04-10 NOTE — PROGRESS NOTES
BEHAVIORAL HEALTH FOLLOW-UP NOTE     4/10/2022     Patient was seen and examined in person, Chart reviewed   Patient's case discussed with staff/team    Chief Complaint: \"I went to a group today\"     Interim History: Sitting in his room alone, guarded, paranoid. Is improving slightly. He tells me he attended a group today. He tells me he does not remember talking to me yesterday. Patient is thought blocking, very delayed response. He has no insight or judgment acutely psychotic paranoid internally stimulated. He is not making eye contact with me today, however does thank me for checking on him. Appetite:   [x] Normal/Unchanged  [] Increased  [] Decreased      Sleep:       [x] Normal/Unchanged  [] Fair       [] Poor              Energy:    [x] Normal/Unchanged  [] Increased  [] Decreased        SI [] Present  [x] Absent    HI  []Present  [x] Absent     Aggression:  [] yes  [x] no    Patient is [x] able  [] unable to CONTRACT FOR SAFETY     PAST MEDICAL/PSYCHIATRIC HISTORY:   Past Medical History:   Diagnosis Date    Smoking        FAMILY/SOCIAL HISTORY:  No family history on file.   Social History     Socioeconomic History    Marital status: Single     Spouse name: Not on file    Number of children: 1    Years of education: 9    Highest education level: Not on file   Occupational History    Not on file   Tobacco Use    Smoking status: Never Smoker    Smokeless tobacco: Never Used    Tobacco comment: non smoker   Vaping Use    Vaping Use: Never used   Substance and Sexual Activity    Alcohol use: Not Currently    Drug use: Yes     Types: Marijuana Guerda Ing)    Sexual activity: Never     Partners: Female   Other Topics Concern    Not on file   Social History Narrative    Not on file     Social Determinants of Health     Financial Resource Strain:     Difficulty of Paying Living Expenses: Not on file   Food Insecurity:     Worried About Running Out of Food in the Last Year: Not on file    Zach johnson Food in the Last Year: Not on file   Transportation Needs:     Lack of Transportation (Medical): Not on file    Lack of Transportation (Non-Medical): Not on file   Physical Activity:     Days of Exercise per Week: Not on file    Minutes of Exercise per Session: Not on file   Stress:     Feeling of Stress : Not on file   Social Connections:     Frequency of Communication with Friends and Family: Not on file    Frequency of Social Gatherings with Friends and Family: Not on file    Attends Hoahaoism Services: Not on file    Active Member of [address] [place] [address] [employer] or Organizations: Not on file    Attends Club or Organization Meetings: Not on file    Marital Status: Not on file   Intimate Partner Violence:     Fear of Current or Ex-Partner: Not on file    Emotionally Abused: Not on file    Physically Abused: Not on file    Sexually Abused: Not on file   Housing Stability:     Unable to Pay for Housing in the Last Year: Not on file    Number of [place] in the Last Year: Not on file    Unstable Housing in the Last Year: Not on file           ROS:  [x] All negative/unchanged except if checked.  Explain positive(checked items) below:  [] Constitutional  [] Eyes  [] Ear/Nose/Mouth/Throat  [] Respiratory  [] CV  [] GI  []   [] Musculoskeletal  [] Skin/Breast  [] Neurological  [] Endocrine  [] Heme/Lymph  [] Allergic/Immunologic    Explanation:     MEDICATIONS:    Current Facility-Administered Medications:     LORazepam (ATIVAN) tablet 0.5 mg, 0.5 mg, Oral, BID, ASHLYN Hall - CNP, 0.5 mg at 04/07/22 2033    risperiDONE ER (PERSERIS) injection 120 mg, 120 mg, SubCUTAneous, Q30 Days, ASHLYN Chacon - CNP, 766 mg at 04/04/22 1651    OLANZapine zydis (ZYPREXA) disintegrating tablet 5 mg, 5 mg, Oral, Nightly, ASHLYN Chacon - CNP, 5 mg at 04/07/22 2033    divalproex (DEPAKOTE) DR tablet 500 mg, 500 mg, Oral, 2 times per day, ASHLYN Mercer - CNP, 900 mg at 04/09/22 2100    acetaminophen (TYLENOL) tablet 650 mg, 650 mg, Oral, Q6H PRN, Tu Gutierrez MD    magnesium hydroxide (MILK OF MAGNESIA) 400 MG/5ML suspension 30 mL, 30 mL, Oral, Daily PRN, Tu Gutierrez MD    aluminum & magnesium hydroxide-simethicone (MAALOX) 200-200-20 MG/5ML suspension 30 mL, 30 mL, Oral, PRN, Tu Gutierrez MD    haloperidol (HALDOL) tablet 5 mg, 5 mg, Oral, Q6H PRN **OR** haloperidol lactate (HALDOL) injection 5 mg, 5 mg, IntraMUSCular, Q6H PRN, Tu Gutierrez MD    melatonin tablet 3 mg, 3 mg, Oral, Nightly, Tu Gutierrez MD, 3 mg at 04/07/22 2033    hydrOXYzine (VISTARIL) capsule 50 mg, 50 mg, Oral, TID PRN, Tu Gutierrez MD      Examination:  /78   Pulse 95   Temp 97.9 °F (36.6 °C)   Resp 16   Ht 5' 8\" (1.727 m)   Wt 210 lb (95.3 kg)   SpO2 100%   BMI 31.93 kg/m²   Gait - steady  Medication side effects(SE): Denies    Mental Status Examination:    Level of consciousness:  within normal limits   Appearance:  fair grooming and fair hygiene  Behavior/Motor:  no abnormalities noted  Attitude toward examiner:  cooperative  Speech:  spontaneous, normal rate and normal volume   Mood: \" I am okay. \"  Affect: Irritable easily agitated   Thought processes: Linear without flight of ideas loose associations  Thought content: Appears internally stimulated and paranoid denies SI/HI intent or plan denies auditory visual hallucinations but appears internally preoccupied  Cognition:  oriented to person, place, and time   Concentration intact  Insight poor   Judgement poor     ASSESSMENT:   Patient symptoms are:  [] Well controlled  [] Improving  [] Worsening  [x] No change      Diagnosis:   Principal Problem:    Schizophrenia, paranoid ([facility]  Resolved Problems:    * No resolved hospital problems. *      LABS:    No results for input(s): WBC, HGB, PLT in the last 72 hours. No results for input(s): NA, K, CL, CO2, BUN, CREATININE, GLUCOSE in the last 72 hours.   No results for input(s): BILITOT, ALKPHOS, AST, ALT in the last 72 hours. Lab Results   Component Value Date    LABAMPH NOT DETECTED 03/29/2022    BARBSCNU NOT DETECTED 03/29/2022    LABBENZ NOT DETECTED 03/29/2022    LABMETH NOT DETECTED 03/29/2022    OPIATESCREENURINE NOT DETECTED 03/29/2022    PHENCYCLIDINESCREENURINE NOT DETECTED 03/29/2022    ETOH <10 03/29/2022     No results found for: TSH, FREET4  No results found for: LITHIUM  Lab Results   Component Value Date    VALPROATE 3 (L) 03/29/2022       Treatment Plan:  Reviewed current Medications with the patient. Risks, benefits, side effects, drug-to-drug interactions and alternatives to treatment were discussed. Collateral information:   CD evaluation  Encourage patient to attend group and other milieu activities. Discharge planning discussed with the patient and treatment team.    Perseris 120 mg IM every 30 days  Zydis 5 mg at bedtime  Continue Ativan 0.5 twice daily for catatonic-like features  Dual antipsychotic therapy as patient is not stabilizing with one agent will need a long-acting injection but states he has done well with Zyprexa in the past  Continue Depakote 500 mg twice daily for mood stabilization        PSYCHOTHERAPY/COUNSELING:  [x] Therapeutic interview  [x] Supportive  [] CBT  [] Ongoing  [] Other    [x] Patient continues to need, on a daily basis, active treatment furnished directly by or requiring the supervision of inpatient psychiatric personnel                  Behavioral Services                                              Medicare Re-Certification    I certify that the inpatient psychiatric hospital services furnished since the previous certification/re-certification were, and continue to be, medically necessary for;    [x] (1) Treatment which could reasonably be expected to improve the patient's condition,    [] (2) Or for diagnostic study.     Estimated length of stay/service 3-17 days based on stability    Plan for post-hospital care outpatient psychiatric and counseling services    This patient continues to need, on a daily basis, active treatment furnished directly by or requiring the supervision of inpatient psychiatric personnel.     Electronically signed by ASHLYN Corea CNP on 4/10/2022 at 9:17 AM   Electronically signed by ASHLYN Corea CNP on 4/10/2022 at 9:17 AM              Electronically signed by ASHLYN Corea CNP on 4/10/2022 at 9:17 AM

## 2022-04-10 NOTE — PLAN OF CARE
Problem: Altered Mood, Psychotic Behavior:  Goal: Able to demonstrate trust by eating, participating in treatment and following staff's direction  Description: Able to demonstrate trust by eating, participating in treatment and following staff's direction  4/10/2022 1802 by Arianne Ferguson RN  Outcome: Ongoing     Problem: Altered Mood, Psychotic Behavior:  Goal: Able to verbalize decrease in frequency and intensity of hallucinations  Description: Able to verbalize decrease in frequency and intensity of hallucinations  4/10/2022 1802 by Arianne Ferguson RN  Outcome: Ongoing     Problem: Altered Mood, Psychotic Behavior:  Goal: Able to verbalize reality based thinking  Description: Able to verbalize reality based thinking  4/10/2022 1802 by Arianne Ferguson RN  Outcome: Ongoing   Pt continues to be withdrawn to his room. He did come out on the unit for dinner and then returned to his room. He remains paranoid and guarded with delayed responses. He denies any harmful ideations and hallucinations but he does appear internally stimulated and preoccupied.

## 2022-04-10 NOTE — PROGRESS NOTES
Pt is out on the unit at present and walking a little in the pandey. On approach he is polite and interacting a little more. He states \"I'm alright. \"  His responses are still a little delayed but improved somewhat. He states that he is not feeling paranoid at this time and that he is trying to get better.

## 2022-04-11 PROCEDURE — 6370000000 HC RX 637 (ALT 250 FOR IP): Performed by: NURSE PRACTITIONER

## 2022-04-11 PROCEDURE — 99232 SBSQ HOSP IP/OBS MODERATE 35: CPT | Performed by: NURSE PRACTITIONER

## 2022-04-11 PROCEDURE — 1240000000 HC EMOTIONAL WELLNESS R&B

## 2022-04-11 RX ADMIN — DIVALPROEX SODIUM 500 MG: 500 TABLET, DELAYED RELEASE ORAL at 21:37

## 2022-04-11 RX ADMIN — DIVALPROEX SODIUM 500 MG: 500 TABLET, DELAYED RELEASE ORAL at 07:43

## 2022-04-11 ASSESSMENT — PAIN SCALES - GENERAL
PAINLEVEL_OUTOF10: 0
PAINLEVEL_OUTOF10: 0

## 2022-04-11 NOTE — PROGRESS NOTES
BEHAVIORAL HEALTH FOLLOW-UP NOTE     4/11/2022     Patient was seen and examined in person, Chart reviewed   Patient's case discussed with staff/team    Chief Complaint: \"I went to 2 groups yesterday\"     Interim History: Patient seen in his room states he went to 2 groups yesterday. He seems less guarded he is offering more conversation. He states he does hear voices in his head telling him sexual things but no commands. He denies SI/HI intent or plan denies auditory visual hallucinations he is mostly to himself in his room. Appetite:   [x] Normal/Unchanged  [] Increased  [] Decreased      Sleep:       [x] Normal/Unchanged  [] Fair       [] Poor              Energy:    [x] Normal/Unchanged  [] Increased  [] Decreased        SI [] Present  [x] Absent    HI  []Present  [x] Absent     Aggression:  [] yes  [x] no    Patient is [x] able  [] unable to CONTRACT FOR SAFETY     PAST MEDICAL/PSYCHIATRIC HISTORY:   Past Medical History:   Diagnosis Date    Smoking        FAMILY/SOCIAL HISTORY:  No family history on file.   Social History     Socioeconomic History    Marital status: Single     Spouse name: Not on file    Number of children: 1    Years of education: 9    Highest education level: Not on file   Occupational History    Not on file   Tobacco Use    Smoking status: Never Smoker    Smokeless tobacco: Never Used    Tobacco comment: non smoker   Vaping Use    Vaping Use: Never used   Substance and Sexual Activity    Alcohol use: Not Currently    Drug use: Yes     Types: Marijuana Catherine Aver)    Sexual activity: Never     Partners: Female   Other Topics Concern    Not on file   Social History Narrative    Not on file     Social Determinants of Health     Financial Resource Strain:     Difficulty of Paying Living Expenses: Not on file   Food Insecurity:     Worried About Running Out of Food in the Last Year: Not on file    Zach of Food in the Last Year: Not on file   Transportation Needs:     Lack of Transportation (Medical): Not on file    Lack of Transportation (Non-Medical): Not on file   Physical Activity:     Days of Exercise per Week: Not on file    Minutes of Exercise per Session: Not on file   Stress:     Feeling of Stress : Not on file   Social Connections:     Frequency of Communication with Friends and Family: Not on file    Frequency of Social Gatherings with Friends and Family: Not on file    Attends Sabianist Services: Not on file    Active Member of [address] [place] [address] [employer] or Organizations: Not on file    Attends Club or Organization Meetings: Not on file    Marital Status: Not on file   Intimate Partner Violence:     Fear of Current or Ex-Partner: Not on file    Emotionally Abused: Not on file    Physically Abused: Not on file    Sexually Abused: Not on file   Housing Stability:     Unable to Pay for Housing in the Last Year: Not on file    Number of [place] in the Last Year: Not on file    Unstable Housing in the Last Year: Not on file           ROS:  [x] All negative/unchanged except if checked.  Explain positive(checked items) below:  [] Constitutional  [] Eyes  [] Ear/Nose/Mouth/Throat  [] Respiratory  [] CV  [] GI  []   [] Musculoskeletal  [] Skin/Breast  [] Neurological  [] Endocrine  [] Heme/Lymph  [] Allergic/Immunologic    Explanation:     MEDICATIONS:    Current Facility-Administered Medications:     LORazepam (ATIVAN) tablet 0.5 mg, 0.5 mg, Oral, BID, ASHLYN Uriarte - CNP, 0.5 mg at 04/10/22 2125    risperiDONE ER (PERSERIS) injection 120 mg, 120 mg, SubCUTAneous, Q30 Days, LindaASHLYN Ballard - CNP, 866 mg at 04/04/22 1651    OLANZapine zydis (ZYPREXA) disintegrating tablet 5 mg, 5 mg, Oral, Nightly, Linda B ASHLYN Crowley - CNP, 5 mg at 04/10/22 2125    divalproex (DEPAKOTE) DR tablet 500 mg, 500 mg, Oral, 2 times per day, RuthiASHLYN Mirza - CNP, 345 mg at 04/11/22 0743    acetaminophen (TYLENOL) tablet 650 mg, 650 mg, Oral, Q6H PRN, Nohemy Middleton MD  [address] [place] [address] magnesium hydroxide (MILK OF MAGNESIA) 400 MG/5ML suspension 30 mL, 30 mL, Oral, Daily PRN, Aroldo Canchoal MD    aluminum & magnesium hydroxide-simethicone (MAALOX) 200-200-20 MG/5ML suspension 30 mL, 30 mL, Oral, PRN, Aroldo Canchola MD    haloperidol (HALDOL) tablet 5 mg, 5 mg, Oral, Q6H PRN **OR** haloperidol lactate (HALDOL) injection 5 mg, 5 mg, IntraMUSCular, Q6H PRN, Aroldo Canchola MD    melatonin tablet 3 mg, 3 mg, Oral, Nightly, Aroldo Canchola MD, 3 mg at 04/10/22 2125    hydrOXYzine (VISTARIL) capsule 50 mg, 50 mg, Oral, TID PRN, Aroldo Canchola MD      Examination:  /74   Pulse 90   Temp 98.3 °F (36.8 °C) (Temporal)   Resp 16   Ht 5' 8\" (1.727 m)   Wt 210 lb (95.3 kg)   SpO2 100%   BMI 31.93 kg/m²   Gait - steady  Medication side effects(SE): Denies    Mental Status Examination:    Level of consciousness:  within normal limits   Appearance:  fair grooming and fair hygiene  Behavior/Motor:  no abnormalities noted  Attitude toward examiner:  cooperative  Speech:  spontaneous, normal rate and normal volume   Mood: \" I am okay. \"  Affect: Irritable easily agitated   Thought processes: Linear without flight of ideas loose associations  Thought content: Appears internally stimulated and paranoid denies SI/HI intent or plan denies auditory visual hallucinations but appears internally preoccupied  Cognition:  oriented to person, place, and time   Concentration intact  Insight poor   Judgement poor     ASSESSMENT:   Patient symptoms are:  [] Well controlled  [] Improving  [] Worsening  [x] No change      Diagnosis:   Principal Problem:    Schizophrenia, paranoid ([facility] [address]  Resolved Problems:    * No resolved hospital problems. *      LABS:    No results for input(s): WBC, HGB, PLT in the last 72 hours. No results for input(s): NA, K, CL, CO2, BUN, CREATININE, GLUCOSE in the last 72 hours. No results for input(s): BILITOT, ALKPHOS, AST, ALT in the last 72 hours.   Lab Results   Component Value Date    LABAMPH NOT DETECTED 03/29/2022    BARBSCNU NOT DETECTED 03/29/2022    LABBENZ NOT DETECTED 03/29/2022    LABMETH NOT DETECTED 03/29/2022    OPIATESCREENURINE NOT DETECTED 03/29/2022    PHENCYCLIDINESCREENURINE NOT DETECTED 03/29/2022    ETOH <10 03/29/2022     No results found for: TSH, FREET4  No results found for: LITHIUM  Lab Results   Component Value Date    VALPROATE 3 (L) 03/29/2022       Treatment Plan:  Reviewed current Medications with the patient. Risks, benefits, side effects, drug-to-drug interactions and alternatives to treatment were discussed. Collateral information:   CD evaluation  Encourage patient to attend group and other milieu activities. Discharge planning discussed with the patient and treatment team.    Perseris 120 mg IM every 30 days  Zydis 5 mg at bedtime  Continue Ativan 0.5 twice daily for catatonic-like features  Dual antipsychotic therapy as patient is not stabilizing with one agent will need a long-acting injection but states he has done well with Zyprexa in the past  Continue Depakote 500 mg twice daily for mood stabilization        PSYCHOTHERAPY/COUNSELING:  [x] Therapeutic interview  [x] Supportive  [] CBT  [] Ongoing  [] Other    [x] Patient continues to need, on a daily basis, active treatment furnished directly by or requiring the supervision of inpatient psychiatric personnel                  Behavioral Services                                              Medicare Re-Certification    I certify that the inpatient psychiatric hospital services furnished since the previous certification/re-certification were, and continue to be, medically necessary for;    [x] (1) Treatment which could reasonably be expected to improve the patient's condition,    [] (2) Or for diagnostic study.     Estimated length of stay/service 3-17 days based on stability    Plan for post-hospital care outpatient psychiatric and counseling services    This patient continues to need, on a daily basis, active treatment furnished directly by or requiring the supervision of inpatient psychiatric personnel.     Electronically signed by ASHLYN Ruff CNP on 9/96/1750 at 8:49 AM   Electronically signed by ASHLYN Ruff CNP on 8/97/3657 at 8:49 AM              Electronically signed by ASHLYN Ruff CNP on 2/36/7606 at 8:49 AM

## 2022-04-11 NOTE — BH NOTE
Patient spends 3-7 portion of the day in the day area. Patient seemingly paranoid and watchful of others. Patient has no medications throughout this time period. Eats dinner. No complaints of discomfort.

## 2022-04-11 NOTE — CARE COORDINATION
Juli met with pt. Pt reports feeling okay today. Pt denied SI because it is not godly like. Pt denied HI/VH. Pt reports having AH of his family voices, stated he thought they were trying talk with them through the phone but a staff member never called him to the desk to talk on the phone. Pt reports he cannot recall what the voices were saying to him. Pt reports he did go to a group today. Pt reports he just wants to talk with the doctor about what he is going through and about his medications. Pt reports he did not contact KIM Pittman because it was the weekend, advised pt to try and call him today or tomorrow. Pt reports he doesn't want any trouble. Pt has poor eye contact, responses delayed, flat, blunt, guarded, fair insight/judgement. Juli contacted [facility] and rescheduled pt follow up appointment from 4/13 to 4/21. [facility] is aware that pt will be due for his GARCÍA on 5/4.

## 2022-04-11 NOTE — PROGRESS NOTES
Patient denies SI.HI, or any Hallucinations. States he slept terrible. Eats his meals, stays to self. Took Depakote this morning, refused Ativan. No groups. Will continue to monitor.

## 2022-04-11 NOTE — PLAN OF CARE
Problem: Falls - Risk of:  Goal: Will remain free from falls  Description: Will remain free from falls  4/11/2022 1008 by Kenny Mustafa RN  Outcome: Ongoing  4/11/2022 0450 by Nasreen Dudley RN  Outcome: Met This Shift  Goal: Absence of physical injury  Description: Absence of physical injury  4/11/2022 1008 by Kenny Mustafa RN  Outcome: Ongoing  4/11/2022 0450 by Nasreen Dudley RN  Outcome: Met This Shift     Problem: Altered Mood, Psychotic Behavior:  Goal: Able to demonstrate trust by eating, participating in treatment and following staff's direction  Description: Able to demonstrate trust by eating, participating in treatment and following staff's direction  4/11/2022 1008 by Kenny Mustafa RN  Outcome: Ongoing  4/11/2022 0450 by Nasreen Dudley RN  Outcome: Ongoing  Goal: Able to verbalize decrease in frequency and intensity of hallucinations  Description: Able to verbalize decrease in frequency and intensity of hallucinations  4/11/2022 1008 by Kenny Mustafa RN  Outcome: Ongoing  4/11/2022 0450 by Nasreen Dudley RN  Outcome: Ongoing  Goal: Able to verbalize reality based thinking  Description: Able to verbalize reality based thinking  4/11/2022 1008 by Kenny Mustafa RN  Outcome: Ongoing  4/11/2022 0450 by Nasreen Dudley RN  Outcome: Ongoing  Goal: Absence of self-harm  Description: Absence of self-harm  4/11/2022 1008 by Kenny Mustafa RN  Outcome: Ongoing  4/11/2022 0450 by Nasreen Dudley RN  Outcome: Met This Shift  Goal: Ability to achieve adequate nutritional intake will improve  Description: Ability to achieve adequate nutritional intake will improve  4/11/2022 1008 by Kenny Mustafa RN  Outcome: Ongoing  4/11/2022 0450 by Nasreen Dudley RN  Outcome: Ongoing  Goal: Ability to interact with others will improve  Description: Ability to interact with others will improve  4/11/2022 1008 by Kenny Mustafa RN  Outcome: Ongoing  4/11/2022 0450 by Nasreen Dudley RN  Outcome: Ongoing  Goal: Compliance with prescribed medication regimen will improve  Description: Compliance with prescribed medication regimen will improve  4/11/2022 1008 by Diego Palomo, RN  Outcome: Ongoing  4/11/2022 0450 by Petar Barry RN  Outcome: Ongoing  Goal: Patient specific goal  Description: Patient specific goal  Outcome: Ongoing

## 2022-04-11 NOTE — PROGRESS NOTES
Pt was noted sitting on periphery of OMEGA Morales's group with participation. While doing exercise portion of group pt was noted to be exercising along with group. Still isolative sitting outside of group.

## 2022-04-12 PROCEDURE — 1240000000 HC EMOTIONAL WELLNESS R&B

## 2022-04-12 PROCEDURE — 6370000000 HC RX 637 (ALT 250 FOR IP): Performed by: NURSE PRACTITIONER

## 2022-04-12 PROCEDURE — 99232 SBSQ HOSP IP/OBS MODERATE 35: CPT | Performed by: NURSE PRACTITIONER

## 2022-04-12 RX ADMIN — DIVALPROEX SODIUM 500 MG: 500 TABLET, DELAYED RELEASE ORAL at 21:04

## 2022-04-12 ASSESSMENT — PAIN SCALES - GENERAL
PAINLEVEL_OUTOF10: 0

## 2022-04-12 NOTE — PROGRESS NOTES
Prompted patient to share goal for the day. Patient stated his stomach hurt and he wants to feel better.

## 2022-04-12 NOTE — PLAN OF CARE
Problem: Altered Mood, Psychotic Behavior:  Goal: Able to verbalize decrease in frequency and intensity of hallucinations  Description: Able to verbalize decrease in frequency and intensity of hallucinations  Outcome: Ongoing  Goal: Able to verbalize reality based thinking  Description: Able to verbalize reality based thinking  4/12/2022 1225 by Carline Vega RN  Outcome: Ongoing  4/12/2022 0144 by Alannah Sun RN  Outcome: Ongoing  Goal: Absence of self-harm  Description: Absence of self-harm  4/12/2022 0144 by Alannah Sun RN  Outcome: Met This Shift         PATIENT [address]  REMAINS PARANOID AND WATCHFUL. REPORTS HE HAS BEEN WAITING FOR  FOR DAYS IN HIS ROOM. WOULD NOT ANSWER ME ON ANY HALLUCINATIONS. NO VOICED SUICIDAL OR HOMICIDAL THOUGHTS.

## 2022-04-12 NOTE — PLAN OF CARE
Problem: Altered Mood, Psychotic Behavior:  Goal: Able to verbalize decrease in frequency and intensity of hallucinations  Description: Able to verbalize decrease in frequency and intensity of hallucinations  4/11/2022 2156 by Reji Betts RN  Outcome: Ongoing     Problem: Altered Mood, Psychotic Behavior:  Goal: Able to verbalize reality based thinking  Description: Able to verbalize reality based thinking  4/11/2022 2156 by Reji Betts RN  Outcome: Ongoing     Patient has been out a little more this evening. Using the exercise equipment and having snack. Concentration is impaired, but patient is more talkative. Reports that the Depakote is helping, but still is refusing all others. Patient was educated on the importance of taking all the medications that are prescribed. Denies suicidal and homicidal ideations at this time. Admits to having auditory hallucinations, stating \"Its different shit. They are saying kill him and throw him out the window\". Denies visual hallucinations. Purposeful rounding continued.

## 2022-04-12 NOTE — BH NOTE
Remains isolative to room paranoid suspicious thought blocking refused am meds no insight or judgement emotional support given

## 2022-04-12 NOTE — BH NOTE
Patient remains paranoid. Voicing he should not have came because of this medication. Did set up in dinning room to eat.

## 2022-04-12 NOTE — PROGRESS NOTES
BEHAVIORAL HEALTH FOLLOW-UP NOTE     4/12/2022     Patient was seen and examined in person, Chart reviewed   Patient's case discussed with staff/team    Chief Complaint: Psychotic    Interim History: Patient lying in his bed appears paranoid and psychotic does not offer much conversation he reports command auditory hallucinations saying \"kill him and throw him out the window. \"  Reportedly out more visible in the milieu yesterday using exercise equipment      Appetite:   [x] Normal/Unchanged  [] Increased  [] Decreased      Sleep:       [x] Normal/Unchanged  [] Fair       [] Poor              Energy:    [x] Normal/Unchanged  [] Increased  [] Decreased        SI [] Present  [x] Absent    HI  []Present  [x] Absent     Aggression:  [] yes  [x] no    Patient is [x] able  [] unable to CONTRACT FOR SAFETY     PAST MEDICAL/PSYCHIATRIC HISTORY:   Past Medical History:   Diagnosis Date    Smoking        FAMILY/SOCIAL HISTORY:  No family history on file. Social History     Socioeconomic History    Marital status: Single     Spouse name: Not on file    Number of children: 1    Years of education: 9    Highest education level: Not on file   Occupational History    Not on file   Tobacco Use    Smoking status: Never Smoker    Smokeless tobacco: Never Used    Tobacco comment: non smoker   Vaping Use    Vaping Use: Never used   Substance and Sexual Activity    Alcohol use: Not Currently    Drug use: Yes     Types: Marijuana Kunal Fabio)    Sexual activity: Never     Partners: Female   Other Topics Concern    Not on file   Social History Narrative    Not on file     Social Determinants of Health     Financial Resource Strain:     Difficulty of Paying Living Expenses: Not on file   Food Insecurity:     Worried About Running Out of Food in the Last Year: Not on file    Zach of Food in the Last Year: Not on file   Transportation Needs:     Lack of Transportation (Medical):  Not on file    Lack of Transportation (Non-Medical): Not on file   Physical Activity:     Days of Exercise per Week: Not on file    Minutes of Exercise per Session: Not on file   Stress:     Feeling of Stress : Not on file   Social Connections:     Frequency of Communication with Friends and Family: Not on file    Frequency of Social Gatherings with Friends and Family: Not on file    Attends Sikh Services: Not on file    Active Member of [address] [place] [address] or Organizations: Not on file    Attends Club or Organization Meetings: Not on file    Marital Status: Not on file   Intimate Partner Violence:     Fear of Current or Ex-Partner: Not on file    Emotionally Abused: Not on file    Physically Abused: Not on file    Sexually Abused: Not on file   Housing Stability:     Unable to Pay for Housing in the Last Year: Not on file    Number of [place] in the Last Year: Not on file    Unstable Housing in the Last Year: Not on file           ROS:  [x] All negative/unchanged except if checked.  Explain positive(checked items) below:  [] Constitutional  [] Eyes  [] Ear/Nose/Mouth/Throat  [] Respiratory  [] CV  [] GI  []   [] Musculoskeletal  [] Skin/Breast  [] Neurological  [] Endocrine  [] Heme/Lymph  [] Allergic/Immunologic    Explanation:     MEDICATIONS:    Current Facility-Administered Medications:     LORazepam (ATIVAN) tablet 0.5 mg, 0.5 mg, Oral, BID, Hedda ASHLYN Mcbried - CNP, 0.5 mg at 04/10/22 2125    risperiDONE ER (PERSERIS) injection 120 mg, 120 mg, SubCUTAneous, Q30 Days, Linda Crowley APRN - CNP, 783 mg at 04/04/22 1651    OLANZapine zydis (ZYPREXA) disintegrating tablet 5 mg, 5 mg, Oral, Nightly, Linda Crowley APRN - CNP, 5 mg at 04/10/22 2125    divalproex (DEPAKOTE) DR tablet 500 mg, 500 mg, Oral, 2 times per day, ASHLYN Ruff - CNP, 693 mg at 04/11/22 2137    acetaminophen (TYLENOL) tablet 650 mg, 650 mg, Oral, Q6H PRN, Tabitha Scott MD    magnesium hydroxide (MILK OF MAGNESIA) 400 MG/5ML suspension 30 mL, 30 mL, Oral, Daily PRN, Boo Cintron MD    aluminum & magnesium hydroxide-simethicone (MAALOX) 200-200-20 MG/5ML suspension 30 mL, 30 mL, Oral, PRN, Boo Cintron MD    haloperidol (HALDOL) tablet 5 mg, 5 mg, Oral, Q6H PRN **OR** haloperidol lactate (HALDOL) injection 5 mg, 5 mg, IntraMUSCular, Q6H PRN, Boo Cintron MD    melatonin tablet 3 mg, 3 mg, Oral, Nightly, Boo Cintron MD, 3 mg at 04/10/22 2125    hydrOXYzine (VISTARIL) capsule 50 mg, 50 mg, Oral, TID PRN, Boo Citnron MD      Examination:  /72   Pulse 84   Temp 97.7 °F (36.5 °C) (Oral)   Resp 16   Ht 5' 8\" (1.727 m)   Wt 210 lb (95.3 kg)   SpO2 100%   BMI 31.93 kg/m²   Gait - steady  Medication side effects(SE): Denies    Mental Status Examination:    Level of consciousness:  within normal limits   Appearance:  fair grooming and fair hygiene  Behavior/Motor:  no abnormalities noted  Attitude toward examiner:  cooperative  Speech:  spontaneous, normal rate and normal volume   Mood: \" I am okay. \"  Affect: Irritable easily agitated   Thought processes: Linear without flight of ideas loose associations  Thought content: Appears internally stimulated and paranoid denies SI/HI intent or plan denies auditory visual hallucinations but appears internally preoccupied  Cognition:  oriented to person, place, and time   Concentration intact  Insight poor   Judgement poor     ASSESSMENT:   Patient symptoms are:  [] Well controlled  [] Improving  [] Worsening  [x] No change      Diagnosis:   Principal Problem:    Schizophrenia, paranoid ([facility] [address]  Resolved Problems:    * No resolved hospital problems. *      LABS:    No results for input(s): WBC, HGB, PLT in the last 72 hours. No results for input(s): NA, K, CL, CO2, BUN, CREATININE, GLUCOSE in the last 72 hours. No results for input(s): BILITOT, ALKPHOS, AST, ALT in the last 72 hours.   Lab Results   Component Value Date    LABAMPH NOT DETECTED 03/29/2022    BARBSCNU NOT DETECTED 03/29/2022    LABBENZ NOT DETECTED 03/29/2022    LABMETH NOT DETECTED 03/29/2022    OPIATESCREENURINE NOT DETECTED 03/29/2022    PHENCYCLIDINESCREENURINE NOT DETECTED 03/29/2022    ETOH <10 03/29/2022     No results found for: TSH, FREET4  No results found for: LITHIUM  Lab Results   Component Value Date    VALPROATE 3 (L) 03/29/2022       Treatment Plan:  Reviewed current Medications with the patient. Risks, benefits, side effects, drug-to-drug interactions and alternatives to treatment were discussed. Collateral information:   CD evaluation  Encourage patient to attend group and other milieu activities. Discharge planning discussed with the patient and treatment team.    Perseris 120 mg IM every 30 days  Zydis 5 mg at bedtime  Continue Ativan 0.5 twice daily for catatonic-like features  Dual antipsychotic therapy as patient is not stabilizing with one agent will need a long-acting injection but states he has done well with Zyprexa in the past  Continue Depakote 500 mg twice daily for mood stabilization        PSYCHOTHERAPY/COUNSELING:  [x] Therapeutic interview  [x] Supportive  [] CBT  [] Ongoing  [] Other    [x] Patient continues to need, on a daily basis, active treatment furnished directly by or requiring the supervision of inpatient psychiatric personnel                  Behavioral Services                                              Medicare Re-Certification    I certify that the inpatient psychiatric hospital services furnished since the previous certification/re-certification were, and continue to be, medically necessary for;    [x] (1) Treatment which could reasonably be expected to improve the patient's condition,    [] (2) Or for diagnostic study.     Estimated length of stay/service 3-17 days based on stability    Plan for post-hospital care outpatient psychiatric and counseling services    This patient continues to need, on a daily basis, active treatment furnished directly by or requiring the

## 2022-04-12 NOTE — BH NOTE
Offered patient scheduled morning medications. Patient refused, stating \"I don't feel good today, I'm sick. You gave me that shot wrong it's supposed to go in my arm and you put it in my stomach and now I'm sick. There's a lump in my stomach. \" Explained to patient there is supposed to be a lump under the skin in the abdominal area where the Perseris was injected. Patient was argumentative, stating \"No there aint supposed to be a lump nowhere I had this shot before and they did it in my arm. \" Attempts at patient education met with resistance. Patient became dismissive.

## 2022-04-12 NOTE — PLAN OF CARE
Addendum for 2p on 4. 11. Marcell attended the entirety the 1 h group on the amazing sense of hearing which we often take for granted. Partly attentive to a/v materials presented. Declined to complete written project or stand and do a short presentation on his favorite musical instrument. However, he was courteous and attentive to peers during their presentations.

## 2022-04-12 NOTE — PROGRESS NOTES
Attended evening relaxation group. Enjoyed coping skills carol ann. Minimally interactive , but willing to play game with students. Accepting of support and encouragement to be out of room more.

## 2022-04-13 PROCEDURE — 6370000000 HC RX 637 (ALT 250 FOR IP): Performed by: NURSE PRACTITIONER

## 2022-04-13 PROCEDURE — 99232 SBSQ HOSP IP/OBS MODERATE 35: CPT | Performed by: NURSE PRACTITIONER

## 2022-04-13 PROCEDURE — 1240000000 HC EMOTIONAL WELLNESS R&B

## 2022-04-13 RX ADMIN — DIVALPROEX SODIUM 500 MG: 500 TABLET, DELAYED RELEASE ORAL at 09:41

## 2022-04-13 RX ADMIN — DIVALPROEX SODIUM 500 MG: 500 TABLET, DELAYED RELEASE ORAL at 22:03

## 2022-04-13 RX ADMIN — OLANZAPINE 5 MG: 5 TABLET, ORALLY DISINTEGRATING ORAL at 22:02

## 2022-04-13 ASSESSMENT — PAIN SCALES - GENERAL
PAINLEVEL_OUTOF10: 0

## 2022-04-13 NOTE — PROGRESS NOTES
Patient nai SI,HI, or Hallucinations at this time. Patient out on the unit to self at times. Took his Depakote. Refused Ativan. Does not attend groups. Help was given for his menus as he requested. Patient less delayed in speech, isolates to room most of the time. Patient stated, Gela Burciaga to cuff me and have the PD come and get me\". This nurse asked why would they come and  get you? Patient stated, \"I am a [employer] Message updated to SPEEDY Avery. Will continue to monitor.

## 2022-04-13 NOTE — CARE COORDINATION
Juli contacted  [phone], press 3, then press 5 to speak with Brock Hunter. Juli informed Brock Hunter of the progress pt has making on the unit. Juli informed Brock Hunter that pt does not believe that there are not any available options for him in [facility] and the plan will be for him to return to [facility] temporarily. Juli requested for Brock Hunter to provide transportation for pt at time of discharge as pt wanted Brock Hunter to explain to him why he cant go to [facility]. Brock Hunter reported if he is available he can try to pick pt up. He reported no concern with transportation for pt because he can take pt to [facility] and pt can walk away. Brock Hunter would like to be informed of pt discharge date. Juli received a call from RN case manager with [facility] asking for an update on pt. Juli provided her with an update and advised her there is not a set discharge date at this time.

## 2022-04-13 NOTE — BH NOTE
Six (6) minutes after refusing scheduled morning medications, patient came out of his room and said [facility] Mr. Jiménez Mediate I'll take my Depakote. \" Medication given at 09:41

## 2022-04-13 NOTE — BH NOTE
Patient refused scheduled morning medication, stating \"I don't wanna take it until I talk to the doctor. \"

## 2022-04-13 NOTE — PROGRESS NOTES
BEHAVIORAL HEALTH FOLLOW-UP NOTE     4/13/2022     Patient was seen and examined in person, Chart reviewed   Patient's case discussed with staff/team    Chief Complaint: \" I am sorry did not come to the treatment team in the morning. \"    Interim History: Patient is observed this evening sitting up on the unit eating with other patients. Denies SI/HI intent or plan denies auditory or visual hallucinations he is encouraged to take his medications. He is slightly brighter making less delusional statements his thoughts are more organized tonight. No overt or covert signs psychosis at this time. Appetite:   [x] Normal/Unchanged  [] Increased  [] Decreased      Sleep:       [x] Normal/Unchanged  [] Fair       [] Poor              Energy:    [x] Normal/Unchanged  [] Increased  [] Decreased        SI [] Present  [x] Absent    HI  []Present  [x] Absent     Aggression:  [] yes  [x] no    Patient is [x] able  [] unable to CONTRACT FOR SAFETY     PAST MEDICAL/PSYCHIATRIC HISTORY:   Past Medical History:   Diagnosis Date    Smoking        FAMILY/SOCIAL HISTORY:  No family history on file.   Social History     Socioeconomic History    Marital status: Single     Spouse name: Not on file    Number of children: 1    Years of education: 9    Highest education level: Not on file   Occupational History    Not on file   Tobacco Use    Smoking status: Never Smoker    Smokeless tobacco: Never Used    Tobacco comment: non smoker   Vaping Use    Vaping Use: Never used   Substance and Sexual Activity    Alcohol use: Not Currently    Drug use: Yes     Types: Marijuana Ivanna Mcintyre)    Sexual activity: Never     Partners: Female   Other Topics Concern    Not on file   Social History Narrative    Not on file     Social Determinants of Health     Financial Resource Strain:     Difficulty of Paying Living Expenses: Not on file   Food Insecurity:     Worried About Running Out of Food in the Last Year: Not on file    Zach john[employer] in the Last Year: Not on file   Transportation Needs:     Lack of Transportation (Medical): Not on file    Lack of Transportation (Non-Medical): Not on file   Physical Activity:     Days of Exercise per Week: Not on file    Minutes of Exercise per Session: Not on file   Stress:     Feeling of Stress : Not on file   Social Connections:     Frequency of Communication with Friends and Family: Not on file    Frequency of Social Gatherings with Friends and Family: Not on file    Attends Catholic Services: Not on file    Active Member of [address] [place] [address] [employer] or Organizations: Not on file    Attends Club or Organization Meetings: Not on file    Marital Status: Not on file   Intimate Partner Violence:     Fear of Current or Ex-Partner: Not on file    Emotionally Abused: Not on file    Physically Abused: Not on file    Sexually Abused: Not on file   Housing Stability:     Unable to Pay for Housing in the Last Year: Not on file    Number of [place] in the Last Year: Not on file    Unstable Housing in the Last Year: Not on file           ROS:  [x] All negative/unchanged except if checked.  Explain positive(checked items) below:  [] Constitutional  [] Eyes  [] Ear/Nose/Mouth/Throat  [] Respiratory  [] CV  [] GI  []   [] Musculoskeletal  [] Skin/Breast  [] Neurological  [] Endocrine  [] Heme/Lymph  [] Allergic/Immunologic    Explanation:     MEDICATIONS:    Current Facility-Administered Medications:     LORazepam (ATIVAN) tablet 0.5 mg, 0.5 mg, Oral, BID, Vinicio Crowley APRN - CNP, 0.5 mg at 04/10/22 2125    risperiDONE ER (PERSERIS) injection 120 mg, 120 mg, SubCUTAneous, Q30 Days, Lindaalcira Crowley APRN - CNP, 618 mg at 04/04/22 1651    OLANZapine zydis (ZYPREXA) disintegrating tablet 5 mg, 5 mg, Oral, Nightly, Lindaalcira Crowley APRN - CNP, 5 mg at 04/10/22 2125    divalproex (DEPAKOTE) DR tablet 500 mg, 500 mg, Oral, 2 times per day, Radha Orr APRN - CNP, 423 mg at 04/12/22 2104    acetaminophen (TYLENOL) tablet 650 mg, 650 mg, Oral, Q6H PRN, Zaida Higgins MD    magnesium hydroxide (MILK OF MAGNESIA) 400 MG/5ML suspension 30 mL, 30 mL, Oral, Daily PRN, Zaida Higgins MD    aluminum & magnesium hydroxide-simethicone (MAALOX) 200-200-20 MG/5ML suspension 30 mL, 30 mL, Oral, PRN, Zaida Higgins MD    haloperidol (HALDOL) tablet 5 mg, 5 mg, Oral, Q6H PRN **OR** haloperidol lactate (HALDOL) injection 5 mg, 5 mg, IntraMUSCular, Q6H PRN, Zaida Higgins MD    melatonin tablet 3 mg, 3 mg, Oral, Nightly, Zaida Higgins MD, 3 mg at 04/10/22 2125    hydrOXYzine (VISTARIL) capsule 50 mg, 50 mg, Oral, TID PRN, Zaida Higgins MD      Examination:  /71   Pulse 74   Temp 97.2 °F (36.2 °C) (Temporal)   Resp 14   Ht 5' 8\" (1.727 m)   Wt 210 lb (95.3 kg)   SpO2 100%   BMI 31.93 kg/m²   Gait - steady  Medication side effects(SE): Denies    Mental Status Examination:    Level of consciousness:  within normal limits   Appearance:  fair grooming and fair hygiene  Behavior/Motor:  no abnormalities noted  Attitude toward examiner:  cooperative  Speech:  spontaneous, normal rate and normal volume   Mood: \" I am okay. \"  Affect: Irritable easily agitated   Thought processes: Linear without flight of ideas loose associations  Thought content: Appears internally stimulated and paranoid denies SI/HI intent or plan denies auditory visual hallucinations but appears internally preoccupied  Cognition:  oriented to person, place, and time   Concentration intact  Insight poor   Judgement poor     ASSESSMENT:   Patient symptoms are:  [] Well controlled  [] Improving  [] Worsening  [x] No change      Diagnosis:   Principal Problem:    Schizophrenia, paranoid ([facility] [address]  Resolved Problems:    * No resolved hospital problems. *      LABS:    No results for input(s): WBC, HGB, PLT in the last 72 hours. No results for input(s): NA, K, CL, CO2, BUN, CREATININE, GLUCOSE in the last 72 hours.   No results for input(s): BILITOT, ALKPHOS, AST, ALT in the last 72 hours. Lab Results   Component Value Date    LABAMPH NOT DETECTED 03/29/2022    BARBSCNU NOT DETECTED 03/29/2022    LABBENZ NOT DETECTED 03/29/2022    LABMETH NOT DETECTED 03/29/2022    OPIATESCREENURINE NOT DETECTED 03/29/2022    PHENCYCLIDINESCREENURINE NOT DETECTED 03/29/2022    ETOH <10 03/29/2022     No results found for: TSH, FREET4  No results found for: LITHIUM  Lab Results   Component Value Date    VALPROATE 3 (L) 03/29/2022       Treatment Plan:  Reviewed current Medications with the patient. Risks, benefits, side effects, drug-to-drug interactions and alternatives to treatment were discussed. Collateral information:   CD evaluation  Encourage patient to attend group and other milieu activities. Discharge planning discussed with the patient and treatment team.    Perseris 120 mg IM every 30 days  Zydis 5 mg at bedtime  Continue Ativan 0.5 twice daily for catatonic-like features  Dual antipsychotic therapy as patient is not stabilizing with one agent will need a long-acting injection but states he has done well with Zyprexa in the past  Continue Depakote 500 mg twice daily for mood stabilization        PSYCHOTHERAPY/COUNSELING:  [x] Therapeutic interview  [x] Supportive  [] CBT  [] Ongoing  [] Other    [x] Patient continues to need, on a daily basis, active treatment furnished directly by or requiring the supervision of inpatient psychiatric personnel                  Behavioral Services                                              Medicare Re-Certification    I certify that the inpatient psychiatric hospital services furnished since the previous certification/re-certification were, and continue to be, medically necessary for;    [x] (1) Treatment which could reasonably be expected to improve the patient's condition,    [] (2) Or for diagnostic study.     Estimated length of stay/service 3-17 days based on stability    Plan for post-hospital care outpatient psychiatric and counseling services    This patient continues to need, on a daily basis, active treatment furnished directly by or requiring the supervision of inpatient psychiatric personnel.     Electronically signed by ASHLYN Kirk CNP on 5/77/6187 at 8:49 AM   Electronically signed by ASHLNY Kirk CNP on 5/14/7099 at 8:49 AM              Electronically signed by ASHLYN Kirk CNP on 2/83/4898 at 8:49 AM

## 2022-04-14 VITALS
HEART RATE: 80 BPM | DIASTOLIC BLOOD PRESSURE: 74 MMHG | WEIGHT: 210 LBS | BODY MASS INDEX: 31.83 KG/M2 | SYSTOLIC BLOOD PRESSURE: 110 MMHG | RESPIRATION RATE: 15 BRPM | OXYGEN SATURATION: 100 % | HEIGHT: 68 IN | TEMPERATURE: 98.1 F

## 2022-04-14 PROCEDURE — 99239 HOSP IP/OBS DSCHRG MGMT >30: CPT | Performed by: NURSE PRACTITIONER

## 2022-04-14 PROCEDURE — 6370000000 HC RX 637 (ALT 250 FOR IP): Performed by: NURSE PRACTITIONER

## 2022-04-14 RX ORDER — DIVALPROEX SODIUM 500 MG/1
500 TABLET, DELAYED RELEASE ORAL EVERY 12 HOURS SCHEDULED
Qty: 90 TABLET | Refills: 3 | Status: ON HOLD | OUTPATIENT
Start: 2022-04-14 | End: 2022-09-01 | Stop reason: HOSPADM

## 2022-04-14 RX ADMIN — DIVALPROEX SODIUM 500 MG: 500 TABLET, DELAYED RELEASE ORAL at 08:41

## 2022-04-14 NOTE — BH NOTE
Out for snack more pleasant today poor insight and judgement remains selective with medications isolative non social with peers paranoid and suspicious denies any SI HI or pandey emotional support given

## 2022-04-14 NOTE — PROGRESS NOTES
[facility]  Discharge Note    Pt discharged with followings belongings:   Dental Appliances: None  Vision - Corrective Lenses: None  Hearing Aid: None  Jewelry: None  Body Piercings Removed: N/A  Clothing:  (Bagged belongings to be sent with transport.)  Were All Patient Medications Collected?: Not Applicable   Valuables sent home with patient or returned to patient. Patient education on aftercare instructions: yes. Patient verbalize understanding of AVS:  yes.     Status EXAM upon discharge:  Status and Exam  Normal: No  Facial Expression: Worried,Expressionless  Affect: Blunt  Level of Consciousness: Alert  Mood:Normal: No  Mood: Labile,Anxious,Irritable  Motor Activity:Normal: No  Motor Activity: Decreased  Interview Behavior: Irritable,Cooperative (NON COMPLIANT)  Preception: [place] to Person,[place] to Time,[place] to Place  Attention:Normal: No  Attention: Distractible  Thought Processes: Circumstantial,Blocking  Thought Content:Normal: No  Thought Content: Preoccupations,Paranoia,Delusions  Hallucinations: None  Delusions: Yes  Delusions: Persecution  Memory:Normal: No  Memory: Poor Recent  Insight and Judgment: No  Insight and Judgment: Poor Judgment,Poor Insight  Present Suicidal Ideation: No  Present Homicidal Ideation: No      Metabolic Screening:    No results found for: LABA1C    No results found for: CHOL  No results found for: TRIG  No results found for: HDL  No components found for: LDLCAL  No results found for: Roberto Steward RN

## 2022-04-14 NOTE — CARE COORDINATION
Sw met with pt to discuss his discharge. Pt is unable to report where he would like to go at time of discharge. Pt is very argumentative with Sw about where he is currently located, repeatedly saying that [address] [place] [address] and [facility]) are different and that this is a / unit. Pt will not discuss discharge planning with Sw until he speaks with his PO Mikaela Merida face to face. Pt reports he will not talk with Mikaela Merida over the phone because it could be an imposter. Juli spoke with Sw supervisor and was informed pt PO can come onto the unit. Juli contacted  [phone], press 3, then press 5 and spoke with Mikaela Merida. Mikaela Merida reported he can come meet with pt around 12:30 pm-1:00 pm today. Juli informed charge nurse, pt assigned RN, and NP. Sw informed pt of this. Pt was less irritable and argumentative at this time. Pt stated when he first got to this unit another pt made a comment about this being like senior living, pt reports he didn't want any trouble. Pt is pleased to be able to speak with Mikaela Merida face to face about his discharge plan.

## 2022-04-14 NOTE — DISCHARGE SUMMARY
DISCHARGE SUMMARY      Patient ID:  Reema Nolen  [id number]  65 y.o.  1980    Admit date: 3/29/2022    Discharge date and time: 4/14/2022    Admitting Physician: Scar Valladares MD     Discharge Physician: Dr Yoanna Phillips MD    Discharge Diagnoses:   Patient Active Problem List   Diagnosis    Closed fracture of right ankle    Closed fracture of right fibula and tibia    Gastroesophageal reflux disease without esophagitis    Morbid obesity ([address]    Schizophrenia, paranoid ([facility]       Admission Condition: poor    Discharged Condition: stable    Admission Circumstance: presented to the ED after [facility] reporting auditory hallucinations the voices are trying to kill him. PAST MEDICAL/PSYCHIATRIC HISTORY:   Past Medical History:   Diagnosis Date    Smoking        FAMILY/SOCIAL HISTORY:  No family history on file. Social History     Socioeconomic History    Marital status: Single     Spouse name: Not on file    Number of children: 1    Years of education: 9    Highest education level: Not on file   Occupational History    Not on file   Tobacco Use    Smoking status: Never Smoker    Smokeless tobacco: Never Used    Tobacco comment: non smoker   Vaping Use    Vaping Use: Never used   Substance and Sexual Activity    Alcohol use: Not Currently    Drug use: Yes     Types: Marijuana [place])    Sexual activity: Never     Partners: Female   Other Topics Concern    Not on file   Social History Narrative    Not on file     Social Determinants of Health     Financial Resource Strain:     Difficulty of Paying Living Expenses: Not on file   Food Insecurity:     Worried About Running Out of Food in the Last Year: Not on file    Zach of Food in the Last Year: Not on file   Transportation Needs:     Lack of Transportation (Medical): Not on file    Lack of Transportation (Non-Medical):  Not on file   Physical Activity:     Days of Exercise per Week: Not on file    Minutes of Exercise per Session: Not on file   Stress:     Feeling of Stress : Not on file   Social Connections:     Frequency of Communication with Friends and Family: Not on file    Frequency of Social Gatherings with Friends and Family: Not on file    Attends [employer] Services: Not on file    Active Member of [address] [place] [address] or Organizations: Not on file    Attends Club or Organization Meetings: Not on file    Marital Status: Not on file   Intimate Partner Violence:     Fear of Current or Ex-Partner: Not on file    Emotionally Abused: Not on file    Physically Abused: Not on file    Sexually Abused: Not on file   Housing Stability:     Unable to Pay for Housing in the Last Year: Not on file    Number of [place] in the Last Year: Not on file    Unstable Housing in the Last Year: Not on file       MEDICATIONS:    Current Facility-Administered Medications:     LORazepam (ATIVAN) tablet 0.5 mg, 0.5 mg, Oral, BID, Laura Grayson, APRN - CNP, 0.5 mg at 04/10/22 2125    risperiDONE ER (PERSERIS) injection 120 mg, 120 mg, SubCUTAneous, Q30 Days, Charyl Bamberger Dellick, APRN - CNP, 412 mg at 04/04/22 1651    OLANZapine zydis (ZYPREXA) disintegrating tablet 5 mg, 5 mg, Oral, Nightly, Rhesa Grayson, APRN - CNP, 5 mg at 04/13/22 2202    divalproex (DEPAKOTE) DR tablet 500 mg, 500 mg, Oral, 2 times per day, Rhesa Grayson, APRN - CNP, 815 mg at 04/14/22 0841    acetaminophen (TYLENOL) tablet 650 mg, 650 mg, Oral, Q6H PRN, Maribel Bhatia MD    magnesium hydroxide (MILK OF MAGNESIA) 400 MG/5ML suspension 30 mL, 30 mL, Oral, Daily PRN, Maribel Bhatia MD    aluminum & magnesium hydroxide-simethicone (MAALOX) 200-200-20 MG/5ML suspension 30 mL, 30 mL, Oral, PRN, Maribel Bhatia MD    haloperidol (HALDOL) tablet 5 mg, 5 mg, Oral, Q6H PRN **OR** haloperidol lactate (HALDOL) injection 5 mg, 5 mg, IntraMUSCular, Q6H PRN, Maribel Bhatia MD    melatonin tablet 3 mg, 3 mg, Oral, Nightly, Megan Scott MD Roxanne, 3 mg at 04/10/22 2125    hydrOXYzine (VISTARIL) capsule 50 mg, 50 mg, Oral, TID PRN, Coby Swanson MD    Examination:  /74   Pulse 80   Temp 98.1 °F (36.7 °C)   Resp 15   Ht 5' 8\" (1.727 m)   Wt 210 lb (95.3 kg)   SpO2 100%   BMI 31.93 kg/m²   Gait - steady    HOSPITAL COURSE[de-identified]     Patient was admitted to the unit on 3/29/2022 was closely monitored for psychosis. He was evaluated was treated with the Perseris injection he received 120 mg IM every 30 days on 4/4/2022 is due for his next injection on 5/4/2022 also Depakote 500 mg twice daily for mood stabilization. Medical events were insignificant and patient continued to improve on the floor. He start coming out of his room he is attending groups to socializing with peers. He never made any suicidal statements or any suicidal gestures while in the unit. Social workers obtain collateral information from patient's mother who was able to voicing concerns that she had. She reported no safety concerns no access to any guns. Treatment team felt the patient obtain the maximum benefit from his hospitalization he was set up with an outpatient mental health agency for outpatient follow-up services. At the time of discharge patient did not show any impulsive behavior. He was up on the unit he was attending groups and socializing with peers. He vehemently denied any suicidal homicidal ideations intent or plan. He was eating well and sleeping well there are no neurovegetative signs or symptoms of depression he denied any auditory or visual hallucinations. There are no overt or covert signs of psychosis. He was appreciative of the help that he received here. This patient no longer meets criteria for inpatient hospitalization.           No AVH or paranoid thoughts  No Hopeless or worthless feeling  No active SI/HI  Appetite:  [x] Normal  [] Increased  [] Decreased    Sleep:       [x] Normal  [] Fair       [] Poor            Energy:    [x] Normal  [] Increased  [] Decreased     SI [] Present  [x] Absent  HI  []Present  [x] Absent   Aggression:  [] yes  [x] no  Patient is [x] able  [] unable to CONTRACT FOR SAFETY   Medication side effects(SE):  [x] None(Psych. Meds.) [] Other      Mental Status Examination on discharge:    Level of consciousness:  within normal limits   Appearance:  well-appearing  Behavior/Motor:  no abnormalities noted  Attitude toward examiner:  attentive and good eye contact  Speech:  spontaneous, normal rate and normal volume   Mood: \" My mood is good. \"  Affect: Appropriate   Thought processes:   linear  Thought content:   Patient was no longer reporting any auditory or visual hallucinations still making some bizarre statements at times but he was not observed talking to unseen others or internally stimulated. He denies suicidal homicidal ideations intent or plan was asking to talk with his   Cognition:  oriented to person, place, and time   Concentration intact  Memory intact  Insight good   Judgement fair   Fund of Knowledge adequate      ASSESSMENT:  Patient symptoms are:  [x] Well controlled  [x] Improving  [] Worsening  [] No change    Reason for more than one antipsychotic:  [x] N/A  [] 3 Failed Monotherapy attempts (Drugs tried:)  [] Crossover to a new antipsychotic  [] Taper to Monotherapy from Polypharmacy  [] Augmentation of clozapine therapy due to treatment resistance to single therapy    Diagnosis:  Principal Problem:    Schizophrenia, paranoid ([facility]  Resolved Problems:    * No resolved hospital problems. *      LABS:    No results for input(s): WBC, HGB, PLT in the last 72 hours. No results for input(s): NA, K, CL, CO2, BUN, CREATININE, GLUCOSE in the last 72 hours. No results for input(s): BILITOT, ALKPHOS, AST, ALT in the last 72 hours.   Lab Results   Component Value Date    LABAMPH NOT DETECTED 03/29/2022    BARBSCNU NOT DETECTED 03/29/2022    LABBENZ NOT DETECTED 03/29/2022    LABMETH NOT DETECTED 03/29/2022    OPIATESCREENURINE NOT DETECTED 03/29/2022    PHENCYCLIDINESCREENURINE NOT DETECTED 03/29/2022    ETOH <10 03/29/2022     No results found for: TSH, FREET4  No results found for: LITHIUM  Lab Results   Component Value Date    VALPROATE 3 (L) 03/29/2022       RISK ASSESSMENT AT DISCHARGE: Low risk for suicide and homicide. Treatment Plan:  Reviewed current Medications with the patient. Education provided on the complaince with treatment. Risks, benefits, side effects, drug-to-drug interactions and alternatives to treatment were discussed. Encourage patient to attend outpatient follow up appointment and therapy. Patient was advised to call the outpatient provider, visit the nearest ED or call 911 if symptoms are not manageable. Patient's family member was contacted prior to the discharge. Medication List      START taking these medications    risperiDONE  MG Prsy injection  Commonly known as: PERSERIS  Inject 0.8 mLs into the skin every 30 days for 1 dose Due 5/4/22  Start taking on:  May 4, 2022        CHANGE how you take these medications    divalproex 500 MG DR tablet  Commonly known as: DEPAKOTE  Take 1 tablet by mouth every 12 hours  What changed:   · medication strength  · how much to take  · when to take this  · additional instructions        STOP taking these medications    naproxen 500 MG tablet  Commonly known as: Naprosyn     OLANZapine 10 MG tablet  Commonly known as: ZYPREXA           Where to Get Your Medications      These medications were sent to [facility] [address] [address]    Phone: [phone]   · divalproex 500 MG DR tablet     You can get these medications from any pharmacy    Bring a paper prescription for each of these medications  · risperiDONE  MG Prsy injection       Patient is counseled he must been compliant with all medications all outpatient follow-up appointments    Patient is discharged home in stable condition    TIME SPEND - 35 MINUTES TO COMPLETE THE EVALUATION, DISCHARGE SUMMARY, MEDICATION RECONCILIATION AND FOLLOW UP CARE     Signed:  ASHLYN Coyne CNP  1/04/1395  2:08 PM

## 2022-04-14 NOTE — PROGRESS NOTES
CLINICAL PHARMACY NOTE: MEDS TO BEDS    Total # of Prescriptions Filled: 2   The following medications were delivered to the patient:  · Depakote     Additional Documentation:  To [facility]

## 2022-04-14 NOTE — PLAN OF CARE
Problem: Falls - Risk of:  Goal: Will remain free from falls  Description: Will remain free from falls  4/13/2022 2048 by Thaddeus Lewis RN  Outcome: Met This Shift  4/13/2022 1025 by Chavo Krishnan RN  Outcome: Ongoing     Problem: Altered Mood, Psychotic Behavior:  Goal: Able to verbalize reality based thinking  Description: Able to verbalize reality based thinking  4/13/2022 2048 by Thaddeus Lewis RN  Outcome: Ongoing  4/13/2022 1025 by Chavo Krishnan RN  Outcome: Ongoing     Problem: Altered Mood, Psychotic Behavior:  Goal: Absence of self-harm  Description: Absence of self-harm  4/13/2022 2048 by Thaddeus Lewis RN  Outcome: Met This Shift  4/13/2022 1025 by Chavo Krishnan RN  Outcome: Ongoing     PT denies SI, HI and AVH. Pt is out for meals. Spoke to this nurse about \"trying to get my thoughts right. \" Pt stated his anxiety was a 6. Depression 0. Appetite appropriate. Flat. Avoids gaze. Able to make needs known. Calm. Will continue to monitor.

## 2022-04-14 NOTE — GROUP NOTE
Group Therapy Note    Date: 4/14/2022    Group Start Time: 1000  Group End Time: 6504  Group Topic: Psychoeducation    [facility] [address]        Group Therapy Note    Attendees: 13         Notes: Attended morning wellness group. Engaged and interactive while discussing building new habits. Status After Intervention:  Improved    Participation Level:  Active Listener and Interactive    Participation Quality: Appropriate, Attentive and Sharing      Speech:  normal      Thought Process/Content: Logical      Affective Functioning: Congruent      Mood: euthymic      Level of consciousness:  Alert and Attentive      Response to Learning: Progressing to goal      Endings: None Reported    Modes of Intervention: Education, Support, Socialization and Exploration      Discipline Responsible: Psychoeducational Specialist      Signature:  Cristin Diaz, [address]

## 2022-04-14 NOTE — CARE COORDINATION
KIM Fuller and Juli met with pt to discuss his discharge. Rossy Fuller explained to pt that he is required to return to [facility] until they can find him placement in [address] If pt did not return to [facility] he would be in violation and could face spending 9 months in [facility]. Pt expressed feeling unsafe in [facility] because he is antisocial and he doesn't know anyone here. Pt reported he doesn't want to be in [facility], he wants to return to [facility] and stay with family. Rossy Fuller informed pt that if he went to [facility] at this time he would be homeless. Rossy Fuller advised pt that he would need to give them an address that pt could stay at and then they would have to look at it and determine if it is appropriate. Rossy Fuller informed pt that the last address he gave for his mom was denied because there were children in the home and he cannot stay in a home with children. Rossy Fuller informed pt that he will work with pt on placement in [facility] while he is at the [facility]. Pt was hesitant at first however he did agree to discharge with Rossy Fuller to [facility] temporarily until they can locate other housing for pt. Pt was anxious, cooperative, with fair eye contact, clear responses, and improved insight/judgement. Pt appears less paranoid, delusional, evasive, and guarded. Pt would not answer SI/HI/AVH prior to discharge. However, during this encounter pt did report that he always hears voices and during past encounters with pt he reported that SI/HI are not \"godly like. \" [facility] Childers frequent screening reports pt is at no risk. Juli spoke with Wendy Schaeffer at the [facility][1] and informed him that pt will be returning shortly with Rossy Fuller. Juli also informed Wendy Schaeffer of pt follow up appointment scheduled with [facility].      In order to ensure appropriate transition and discharge planning is in place, the following documents have been transmitted to [facility], as the new outpatient provider:    Trinh Cheek The d/c diagnosis was transmitted to the next care provider   The reason for hospitalization was transmitted to the next care provider   The d/c medications (dosage and indication) were transmitted to the next care provider    The continuing care plan was transmitted to the next care provider

## 2022-06-13 PROBLEM — Z91.14 NONCOMPLIANCE WITH MEDICATIONS: Status: ACTIVE | Noted: 2022-06-13

## 2022-08-14 PROBLEM — F32.A DEPRESSION: Status: ACTIVE | Noted: 2022-08-14

## 2022-08-22 PROBLEM — R45.89 FLAT AFFECT: Status: ACTIVE | Noted: 2022-08-22
